# Patient Record
Sex: MALE | Race: WHITE | Employment: OTHER | URBAN - METROPOLITAN AREA
[De-identification: names, ages, dates, MRNs, and addresses within clinical notes are randomized per-mention and may not be internally consistent; named-entity substitution may affect disease eponyms.]

---

## 2017-10-25 ENCOUNTER — OFFICE VISIT (OUTPATIENT)
Dept: FAMILY MEDICINE CLINIC | Age: 67
End: 2017-10-25

## 2017-10-25 VITALS
RESPIRATION RATE: 12 BRPM | SYSTOLIC BLOOD PRESSURE: 130 MMHG | DIASTOLIC BLOOD PRESSURE: 90 MMHG | TEMPERATURE: 97.4 F | HEART RATE: 80 BPM | WEIGHT: 233 LBS | HEIGHT: 78 IN | BODY MASS INDEX: 26.96 KG/M2

## 2017-10-25 DIAGNOSIS — M70.21 OLECRANON BURSITIS OF RIGHT ELBOW: Primary | ICD-10-CM

## 2017-10-25 PROCEDURE — 3017F COLORECTAL CA SCREEN DOC REV: CPT | Performed by: FAMILY MEDICINE

## 2017-10-25 PROCEDURE — G8419 CALC BMI OUT NRM PARAM NOF/U: HCPCS | Performed by: FAMILY MEDICINE

## 2017-10-25 PROCEDURE — 1123F ACP DISCUSS/DSCN MKR DOCD: CPT | Performed by: FAMILY MEDICINE

## 2017-10-25 PROCEDURE — 1036F TOBACCO NON-USER: CPT | Performed by: FAMILY MEDICINE

## 2017-10-25 PROCEDURE — G8427 DOCREV CUR MEDS BY ELIG CLIN: HCPCS | Performed by: FAMILY MEDICINE

## 2017-10-25 PROCEDURE — 99213 OFFICE O/P EST LOW 20 MIN: CPT | Performed by: FAMILY MEDICINE

## 2017-10-25 PROCEDURE — 4040F PNEUMOC VAC/ADMIN/RCVD: CPT | Performed by: FAMILY MEDICINE

## 2017-10-25 PROCEDURE — G8484 FLU IMMUNIZE NO ADMIN: HCPCS | Performed by: FAMILY MEDICINE

## 2017-10-25 ASSESSMENT — PATIENT HEALTH QUESTIONNAIRE - PHQ9
SUM OF ALL RESPONSES TO PHQ QUESTIONS 1-9: 0
2. FEELING DOWN, DEPRESSED OR HOPELESS: 0
SUM OF ALL RESPONSES TO PHQ9 QUESTIONS 1 & 2: 0
2. FEELING DOWN, DEPRESSED OR HOPELESS: 0
SUM OF ALL RESPONSES TO PHQ QUESTIONS 1-9: 0
1. LITTLE INTEREST OR PLEASURE IN DOING THINGS: 0
SUM OF ALL RESPONSES TO PHQ9 QUESTIONS 1 & 2: 0
1. LITTLE INTEREST OR PLEASURE IN DOING THINGS: 0

## 2017-10-25 ASSESSMENT — ENCOUNTER SYMPTOMS
SWOLLEN GLANDS: 0
VISUAL CHANGE: 0
SORE THROAT: 0
VOMITING: 0
NAUSEA: 0
ABDOMINAL PAIN: 0
CHANGE IN BOWEL HABIT: 0
COUGH: 0

## 2017-10-25 NOTE — PROGRESS NOTES
Subjective  Noelle Mesa, 75 y.o. male presents today with:  Chief Complaint   Patient presents with    Elbow Injury       Bumped right elbow about 2-1/2 weeks ago, immediately swelled-swelling has reduced. Only slightly since then. Not tender or painful. Doesn't restrict range of motion but does \"get in the way \"      Arm Injury   This is a new problem. The current episode started 1 to 4 weeks ago. The problem occurs constantly. The problem has been gradually improving. Associated symptoms include joint swelling. Pertinent negatives include no abdominal pain, anorexia, arthralgias, change in bowel habit, chest pain, chills, congestion, coughing, diaphoresis, fatigue, fever, headaches, myalgias, nausea, neck pain, numbness, rash, sore throat, swollen glands, urinary symptoms, vertigo, visual change, vomiting or weakness. Nothing aggravates the symptoms. He has tried rest for the symptoms. The treatment provided mild relief. Review of Systems   Constitutional: Negative for chills, diaphoresis, fatigue and fever. HENT: Negative for congestion and sore throat. Respiratory: Negative for cough. Cardiovascular: Negative for chest pain. Gastrointestinal: Negative for abdominal pain, anorexia, change in bowel habit, nausea and vomiting. Musculoskeletal: Positive for joint swelling. Negative for arthralgias, myalgias and neck pain. Skin: Negative for rash. Neurological: Negative for vertigo, weakness, numbness and headaches. No past medical history on file. No past surgical history on file. Social History     Social History    Marital status:      Spouse name: N/A    Number of children: N/A    Years of education: N/A     Occupational History    Not on file.      Social History Main Topics    Smoking status: Former Smoker     Quit date: 1/1/1986    Smokeless tobacco: Never Used    Alcohol use Not on file    Drug use: Unknown    Sexual activity: Not on file     Other Topics Concern    Not on file     Social History Narrative    No narrative on file     No family history on file. No Known Allergies  Current Outpatient Prescriptions on File Prior to Visit   Medication Sig Dispense Refill    Omega-3 Fatty Acids (FISH OIL) 1000 MG CAPS Take 1,000 mg by mouth 3 times daily      Multiple Vitamins-Minerals (SUNVITE ACTIVE ADULT 50+ PO) Take by mouth      Coenzyme Q10 (COQ-10) 200 MG CAPS Take by mouth      aspirin 81 MG tablet Take 81 mg by mouth daily      Cholecalciferol (VITAMIN D3) 1000 UNITS TABS Take by mouth      Multiple Vitamins-Minerals (SUPER ANTHONY KALIA 75/BETA DE LA FUENTE PO) Take by mouth prostate vit. No current facility-administered medications on file prior to visit. Objective    Vitals:    10/25/17 1704   BP: (!) 130/90   Site: Right Arm   Position: Sitting   Cuff Size: Medium Adult   Pulse: 80   Resp: 12   Temp: 97.4 °F (36.3 °C)   TempSrc: Tympanic   Weight: 233 lb (105.7 kg)   Height: 6' 8\" (2.032 m)     Physical Exam   Constitutional: He is oriented to person, place, and time. He appears well-developed and well-nourished. No distress. HENT:   Head: Normocephalic and atraumatic. Cardiovascular: Normal rate and regular rhythm. Pulmonary/Chest: Effort normal and breath sounds normal.   Abdominal: Soft. Bowel sounds are normal.   Musculoskeletal:        Right elbow: He exhibits swelling (Olecranon bursa). He exhibits normal range of motion. No olecranon process tenderness noted. Neurological: He is alert and oriented to person, place, and time. Skin: Skin is warm and dry. He is not diaphoretic. Nursing note and vitals reviewed. Assessment & Plan   1. Olecranon bursitis of right elbow       No orders of the defined types were placed in this encounter. No orders of the defined types were placed in this encounter. There are no discontinued medications.     Counseling given: Not Answered  discussed options including observation, drainage, topical therapy, oral therapy. Patient would like to try topical therapy. -Was given samples of Pennsaid. He is to do one packet twice a day. If he does not get significant improvement in 3 weeks. He can come back and we can drain. Patient stated understanding of risks of draining olecranon bursa. Return if symptoms worsen or fail to improve.     Efe Michele, DO

## 2018-08-30 ENCOUNTER — OFFICE VISIT (OUTPATIENT)
Dept: FAMILY MEDICINE CLINIC | Age: 68
End: 2018-08-30

## 2018-08-30 VITALS
DIASTOLIC BLOOD PRESSURE: 76 MMHG | WEIGHT: 233 LBS | OXYGEN SATURATION: 97 % | BODY MASS INDEX: 26.96 KG/M2 | TEMPERATURE: 98.7 F | SYSTOLIC BLOOD PRESSURE: 136 MMHG | HEIGHT: 78 IN | RESPIRATION RATE: 20 BRPM | HEART RATE: 88 BPM

## 2018-08-30 DIAGNOSIS — J01.90 ACUTE BACTERIAL SINUSITIS: ICD-10-CM

## 2018-08-30 DIAGNOSIS — Z87.891 HISTORY OF TOBACCO USE: ICD-10-CM

## 2018-08-30 DIAGNOSIS — J01.90 ACUTE BACTERIAL SINUSITIS: Primary | ICD-10-CM

## 2018-08-30 DIAGNOSIS — B96.89 ACUTE BACTERIAL SINUSITIS: Primary | ICD-10-CM

## 2018-08-30 DIAGNOSIS — B96.89 ACUTE BACTERIAL SINUSITIS: ICD-10-CM

## 2018-08-30 LAB — S PYO AG THROAT QL: NORMAL

## 2018-08-30 PROCEDURE — 99213 OFFICE O/P EST LOW 20 MIN: CPT | Performed by: INTERNAL MEDICINE

## 2018-08-30 PROCEDURE — 87880 STREP A ASSAY W/OPTIC: CPT | Performed by: INTERNAL MEDICINE

## 2018-08-30 RX ORDER — AMOXICILLIN AND CLAVULANATE POTASSIUM 875; 125 MG/1; MG/1
1 TABLET, FILM COATED ORAL 2 TIMES DAILY
Qty: 14 TABLET | Refills: 0 | Status: SHIPPED | OUTPATIENT
Start: 2018-08-30 | End: 2018-09-06

## 2018-08-30 ASSESSMENT — ENCOUNTER SYMPTOMS
SINUS PRESSURE: 1
EYE PAIN: 0
ABDOMINAL PAIN: 0
SHORTNESS OF BREATH: 0
BACK PAIN: 0

## 2018-08-30 NOTE — PROGRESS NOTES
Subjective:      Patient ID: Amber Christiansen is a 70 y.o. male who presents today with:  Chief Complaint   Patient presents with   Omi Carbo     left ear    Headache     top of head    Sinus Problem     lots of drainage    Hoarse     voice is scratchy    Fatigue     feeling weak    Pharyngitis     sore throat    Cough     a little cough       HPI    2 weeks of ear \"whsitling\" and popping. Fatigue. Cough and congestion. No headaches or vision changes. History reviewed. No pertinent past medical history. History reviewed. No pertinent surgical history. Social History     Social History    Marital status:      Spouse name: N/A    Number of children: N/A    Years of education: N/A     Occupational History    Not on file. Social History Main Topics    Smoking status: Former Smoker     Packs/day: 2.00     Years: 27.00     Start date: 1959     Quit date: 1/1/1986    Smokeless tobacco: Never Used    Alcohol use Yes    Drug use: Unknown    Sexual activity: Not on file     Other Topics Concern    Not on file     Social History Narrative    No narrative on file     No Known Allergies  Current Outpatient Prescriptions on File Prior to Visit   Medication Sig Dispense Refill    Omega-3 Fatty Acids (FISH OIL) 1000 MG CAPS Take 1,000 mg by mouth 3 times daily      Multiple Vitamins-Minerals (SUNVITE ACTIVE ADULT 50+ PO) Take by mouth      Coenzyme Q10 (COQ-10) 200 MG CAPS Take by mouth      aspirin 81 MG tablet Take 81 mg by mouth daily      Cholecalciferol (VITAMIN D3) 1000 UNITS TABS Take by mouth      Multiple Vitamins-Minerals (SUPER ANTHONY KALIA 75/BETA DE LA FUENTE PO) Take by mouth prostate vit. No current facility-administered medications on file prior to visit. I have personally reviewed the ROS, PMH, PFH, and social history     Review of Systems   Constitutional: Negative for chills. HENT: Positive for ear pain and sinus pressure. Negative for congestion.     Eyes: Negative for pain.   Respiratory: Negative for shortness of breath. Cardiovascular: Negative for chest pain. Gastrointestinal: Negative for abdominal pain. Genitourinary: Negative for hematuria. Musculoskeletal: Negative for back pain. Allergic/Immunologic: Negative for immunocompromised state. Neurological: Negative for headaches. Psychiatric/Behavioral: Negative for hallucinations. Objective:   /76 (Site: Left Arm, Position: Sitting, Cuff Size: Large Adult)   Pulse 88   Temp 98.7 °F (37.1 °C) (Tympanic)   Resp 20   Ht 6' 8\" (2.032 m)   Wt 233 lb (105.7 kg)   SpO2 97%   BMI 25.60 kg/m²     Physical Exam   Constitutional: He is oriented to person, place, and time. He appears well-developed and well-nourished. HENT:   Head: Normocephalic. Right Ear: External ear normal.   Left Ear: External ear normal.   No vertical nystagmus    Eyes: Pupils are equal, round, and reactive to light. Neck: No tracheal deviation present. Cardiovascular: Normal rate, regular rhythm and normal heart sounds. Exam reveals no gallop and no friction rub. No murmur heard. Pulmonary/Chest: No respiratory distress. He has no wheezes. He has no rales. Abdominal: Soft. Bowel sounds are normal. He exhibits no distension. There is no rebound. Musculoskeletal: He exhibits no edema. Neurological: He is oriented to person, place, and time. Skin: Skin is warm and dry. Assessment:       Diagnosis Orders   1. Acute bacterial sinusitis  amoxicillin-clavulanate (AUGMENTIN) 875-125 MG per tablet   2. History of tobacco use  US SCREENING FOR AAA         Plan:    Orders per below. Understands aaa rupture can be fatal, he agrees to get this done. Explained risk of worsening infection, and if it should progress despite antibiotics to call 911 immediately.  Explained risk of sepsis, amputation, death, etc.     The patient was reminded that an antibiotic has been prescribed the predicted course is improvement

## 2018-09-01 LAB — THROAT CULTURE: NORMAL

## 2018-09-17 ENCOUNTER — TELEPHONE (OUTPATIENT)
Dept: FAMILY MEDICINE CLINIC | Age: 68
End: 2018-09-17

## 2018-11-29 ENCOUNTER — OFFICE VISIT (OUTPATIENT)
Dept: FAMILY MEDICINE CLINIC | Age: 68
End: 2018-11-29

## 2018-11-29 ENCOUNTER — TELEPHONE (OUTPATIENT)
Dept: FAMILY MEDICINE CLINIC | Age: 68
End: 2018-11-29

## 2018-11-29 VITALS
OXYGEN SATURATION: 98 % | DIASTOLIC BLOOD PRESSURE: 80 MMHG | RESPIRATION RATE: 15 BRPM | WEIGHT: 224.7 LBS | HEIGHT: 78 IN | HEART RATE: 68 BPM | BODY MASS INDEX: 26 KG/M2 | SYSTOLIC BLOOD PRESSURE: 136 MMHG | TEMPERATURE: 97.1 F

## 2018-11-29 DIAGNOSIS — R05.9 COUGH: ICD-10-CM

## 2018-11-29 DIAGNOSIS — I10 ESSENTIAL HYPERTENSION: ICD-10-CM

## 2018-11-29 DIAGNOSIS — J32.1 FRONTAL SINUSITIS, UNSPECIFIED CHRONICITY: Primary | ICD-10-CM

## 2018-11-29 PROCEDURE — 99213 OFFICE O/P EST LOW 20 MIN: CPT | Performed by: INTERNAL MEDICINE

## 2018-11-29 RX ORDER — AMOXICILLIN AND CLAVULANATE POTASSIUM 875; 125 MG/1; MG/1
1 TABLET, FILM COATED ORAL 2 TIMES DAILY
Qty: 20 TABLET | Refills: 0 | Status: SHIPPED | OUTPATIENT
Start: 2018-11-29 | End: 2018-12-09

## 2018-11-29 RX ORDER — AMLODIPINE BESYLATE 2.5 MG/1
2.5 TABLET ORAL DAILY
Qty: 30 TABLET | Refills: 1 | Status: SHIPPED | OUTPATIENT
Start: 2018-11-29 | End: 2020-10-20

## 2018-11-29 RX ORDER — PREDNISONE 20 MG/1
TABLET ORAL
Qty: 10 TABLET | Refills: 0 | Status: SHIPPED | OUTPATIENT
Start: 2018-11-29 | End: 2019-02-07 | Stop reason: ALTCHOICE

## 2018-11-29 ASSESSMENT — ENCOUNTER SYMPTOMS
SINUS PRESSURE: 1
SHORTNESS OF BREATH: 0
BACK PAIN: 0
ABDOMINAL PAIN: 0
EYE PAIN: 0
SINUS PAIN: 1

## 2018-11-29 ASSESSMENT — PATIENT HEALTH QUESTIONNAIRE - PHQ9
SUM OF ALL RESPONSES TO PHQ9 QUESTIONS 1 & 2: 2
2. FEELING DOWN, DEPRESSED OR HOPELESS: 1
SUM OF ALL RESPONSES TO PHQ QUESTIONS 1-9: 2
SUM OF ALL RESPONSES TO PHQ QUESTIONS 1-9: 2
1. LITTLE INTEREST OR PLEASURE IN DOING THINGS: 1

## 2018-11-29 NOTE — PROGRESS NOTES
Subjective:      Patient ID: Shirley Doran is a 70 y.o. male who presents today with:  Chief Complaint   Patient presents with    Facial Pain     sinus pressure, facial pain, nasal drainage, he feels that it may be draining into his chest.     Fatigue     he feels very weak, and tired        HPI  Sinus pain and pressure. Been affecting him for several days. Some ear cracking and popping. He has family with sinus infections. History reviewed. No pertinent past medical history. History reviewed. No pertinent surgical history. Social History     Social History    Marital status:      Spouse name: N/A    Number of children: N/A    Years of education: N/A     Occupational History    Not on file. Social History Main Topics    Smoking status: Former Smoker     Packs/day: 2.00     Years: 27.00     Start date: 1959     Quit date: 1/1/1986    Smokeless tobacco: Never Used    Alcohol use Yes    Drug use: Unknown    Sexual activity: Not on file     Other Topics Concern    Not on file     Social History Narrative    No narrative on file     No Known Allergies  Current Outpatient Prescriptions on File Prior to Visit   Medication Sig Dispense Refill    Omega-3 Fatty Acids (FISH OIL) 1000 MG CAPS Take 1,000 mg by mouth 3 times daily      Multiple Vitamins-Minerals (SUNVITE ACTIVE ADULT 50+ PO) Take by mouth      Coenzyme Q10 (COQ-10) 200 MG CAPS Take by mouth      aspirin 81 MG tablet Take 81 mg by mouth daily      Cholecalciferol (VITAMIN D3) 1000 UNITS TABS Take by mouth      Multiple Vitamins-Minerals (SUPER ANTHONY KALIA 75/BETA DE LA FUENTE PO) Take by mouth prostate vit. No current facility-administered medications on file prior to visit. I have personally reviewed the ROS, PMH, PFH, and social history     Review of Systems   Constitutional: Negative for chills. HENT: Positive for sinus pain and sinus pressure. Negative for congestion. Eyes: Negative for pain.    Respiratory: Negative for shortness of breath. Cardiovascular: Negative for chest pain. Gastrointestinal: Negative for abdominal pain. Genitourinary: Negative for hematuria. Musculoskeletal: Negative for back pain. Allergic/Immunologic: Negative for immunocompromised state. Neurological: Negative for headaches. Psychiatric/Behavioral: Negative for hallucinations. Objective:   /80 (Site: Left Upper Arm, Position: Sitting, Cuff Size: Large Adult)   Pulse 68   Temp 97.1 °F (36.2 °C) (Tympanic)   Resp 15   Ht 6' 8\" (2.032 m)   Wt 224 lb 11.2 oz (101.9 kg)   SpO2 98%   BMI 24.68 kg/m²     Physical Exam   Constitutional: He is oriented to person, place, and time. He appears well-developed and well-nourished. HENT:   Head: Normocephalic. Eyes: Pupils are equal, round, and reactive to light. Neck: No tracheal deviation present. Cardiovascular: Normal rate, regular rhythm and normal heart sounds. Exam reveals no gallop and no friction rub. No murmur heard. Pulmonary/Chest: No respiratory distress. Abdominal: Soft. Bowel sounds are normal. He exhibits no distension. There is no rebound. Musculoskeletal: He exhibits no edema. Neurological: He is oriented to person, place, and time. Skin: Skin is warm and dry. Assessment:       Diagnosis Orders   1. Frontal sinusitis, unspecified chronicity  amoxicillin-clavulanate (AUGMENTIN) 875-125 MG per tablet    predniSONE (DELTASONE) 20 MG tablet   2. Cough  XR CHEST STANDARD (2 VW)   3. Essential hypertension  amLODIPine (NORVASC) 2.5 MG tablet         Plan:    Orders per below  He's eating less but lost weight  Rec he get AAA (Risk of rupture being fatal explained)  If losing weight unintentional call me, get CT thorax. Needs colonoscopy understands risk of cancer. Explained risk of worsening infection, and if it should progress despite antibiotics to call 911 immediately.  Explained risk of sepsis, amputation, death, etc.   See me in 6

## 2018-11-29 NOTE — PATIENT INSTRUCTIONS
Patient Education          guaifenesin  Pronunciation:  ceasarye FEN e sin  Brand:  Mucinex, Robitussin Mucus + Chest Congestion, Triaminic Chest Congestion, Tussin Expectorant, Xpect  What is the most important information I should know about guaifenesin? Follow all directions on your medicine label and package. Tell each of your healthcare providers about all your medical conditions, allergies, and all medicines you use. What is guaifenesin? Guaifenesin is an expectorant. It helps loosen congestion in your chest and throat, making it easier to cough out through your mouth. Guaifenesin is used to reduce chest congestion caused by the common cold, infections, or allergies. Guaifenesin may also be used for purposes not listed in this medication guide. What should I discuss with my healthcare provider before taking guaifenesin? You should not use this medicine if you are allergic to guaifenesin. Ask a doctor or pharmacist if it is safe for you to use this medicine if you have other medical conditions. It is not known whether guaifenesin will harm an unborn baby. Ask a doctor before using this medicine if you are pregnant. It is not known whether guaifenesin passes into breast milk or if it could harm a nursing baby. Ask a doctor before using this medicine if you are breast-feeding a baby. Do not give this medicine to a child without medical advice. How should I take guaifenesin? Use exactly as directed on the label, or as prescribed by your doctor. Do not use in larger or smaller amounts or for longer than recommended. Cough medicine is usually taken only for a short time until your symptoms clear up. Take guaifenesin with food if it upsets your stomach. Measure liquid medicine with the dosing syringe provided, or with a special dose-measuring spoon or medicine cup. If you do not have a dose-measuring device, ask your pharmacist for one.   Drink extra fluids to help loosen the congestion and lubricate your

## 2018-11-30 ENCOUNTER — CARE COORDINATION (OUTPATIENT)
Dept: CARE COORDINATION | Age: 68
End: 2018-11-30

## 2018-11-30 NOTE — CARE COORDINATION
Ambulatory Care Coordination Note  11/30/2018  CM Risk Score: 0  Barbi Mortality Risk Score: 3.85    ACC: Claudean Finer, RN    Summary Note: Called pt as requested by Dr. Tayo Spangler to discuss current needs and possible referral to  for assistance in getting additional insurance. [facility]MM requesting a call back to discuss. Prior to Admission medications    Medication Sig Start Date End Date Taking? Authorizing Provider   amoxicillin-clavulanate (AUGMENTIN) 875-125 MG per tablet Take 1 tablet by mouth 2 times daily for 10 days 11/29/18 12/9/18  Luana Duverney, MD   predniSONE (DELTASONE) 20 MG tablet Take 2 tabs po once daily 11/29/18   Luana Duverney, MD   amLODIPine (NORVASC) 2.5 MG tablet Take 1 tablet by mouth daily 11/29/18   Luana Duverney, MD   Omega-3 Fatty Acids (FISH OIL) 1000 MG CAPS Take 1,000 mg by mouth 3 times daily    Historical Provider, MD   Multiple Vitamins-Minerals ([address] 50+ PO) Take by mouth    Historical Provider, MD   Coenzyme Q10 (COQ-10) 200 MG CAPS Take by mouth    Historical Provider, MD   aspirin 81 MG tablet Take 81 mg by mouth daily    Historical Provider, MD   Cholecalciferol (VITAMIN D3) 1000 UNITS TABS Take by mouth    Historical Provider, MD   Multiple Vitamins-Minerals (SUPER ANTHONY KALIA 75/BETA DE LA FUENTE PO) Take by mouth prostate vit.     Historical Provider, MD       Future Appointments  Date Time Provider Rosa Isela Hebert   1/10/2019 9:30 AM Luana Duverney,  [place], [place] [address]

## 2018-12-14 ENCOUNTER — TELEPHONE (OUTPATIENT)
Dept: FAMILY MEDICINE CLINIC | Age: 68
End: 2018-12-14

## 2019-01-10 ENCOUNTER — CARE COORDINATION (OUTPATIENT)
Dept: CARE COORDINATION | Age: 69
End: 2019-01-10

## 2019-01-22 ENCOUNTER — CARE COORDINATION (OUTPATIENT)
Dept: CARE COORDINATION | Age: 69
End: 2019-01-22

## 2019-02-07 ENCOUNTER — OFFICE VISIT (OUTPATIENT)
Dept: FAMILY MEDICINE CLINIC | Age: 69
End: 2019-02-07

## 2019-02-07 VITALS
WEIGHT: 225 LBS | SYSTOLIC BLOOD PRESSURE: 138 MMHG | DIASTOLIC BLOOD PRESSURE: 84 MMHG | OXYGEN SATURATION: 98 % | HEIGHT: 78 IN | TEMPERATURE: 97.3 F | RESPIRATION RATE: 15 BRPM | HEART RATE: 81 BPM | BODY MASS INDEX: 26.03 KG/M2

## 2019-02-07 DIAGNOSIS — K21.9 GASTROESOPHAGEAL REFLUX DISEASE WITHOUT ESOPHAGITIS: ICD-10-CM

## 2019-02-07 DIAGNOSIS — R10.13 DYSPEPSIA: ICD-10-CM

## 2019-02-07 DIAGNOSIS — K21.9 GASTROESOPHAGEAL REFLUX DISEASE WITHOUT ESOPHAGITIS: Primary | ICD-10-CM

## 2019-02-07 DIAGNOSIS — M54.50 ACUTE LEFT-SIDED LOW BACK PAIN WITHOUT SCIATICA: ICD-10-CM

## 2019-02-07 LAB
ALBUMIN SERPL-MCNC: 4.5 G/DL (ref 3.5–4.6)
ALP BLD-CCNC: 57 U/L (ref 35–104)
ALT SERPL-CCNC: 20 U/L (ref 0–41)
ANION GAP SERPL CALCULATED.3IONS-SCNC: 16 MEQ/L (ref 9–15)
AST SERPL-CCNC: 19 U/L (ref 0–40)
BASOPHILS ABSOLUTE: 0.1 K/UL (ref 0–0.2)
BASOPHILS RELATIVE PERCENT: 0.8 %
BILIRUB SERPL-MCNC: <0.2 MG/DL (ref 0.2–0.7)
BILIRUBIN URINE: NEGATIVE
BLOOD, URINE: NEGATIVE
BUN BLDV-MCNC: 26 MG/DL (ref 8–23)
CALCIUM SERPL-MCNC: 10.7 MG/DL (ref 8.5–9.9)
CHLORIDE BLD-SCNC: 100 MEQ/L (ref 95–107)
CLARITY: CLEAR
CO2: 27 MEQ/L (ref 20–31)
COLOR: YELLOW
CREAT SERPL-MCNC: 0.89 MG/DL (ref 0.7–1.2)
EOSINOPHILS ABSOLUTE: 0.1 K/UL (ref 0–0.7)
EOSINOPHILS RELATIVE PERCENT: 1.7 %
GFR AFRICAN AMERICAN: >60
GFR NON-AFRICAN AMERICAN: >60
GLOBULIN: 3.3 G/DL (ref 2.3–3.5)
GLUCOSE BLD-MCNC: 101 MG/DL (ref 70–99)
GLUCOSE URINE: NEGATIVE MG/DL
HCT VFR BLD CALC: 48 % (ref 42–52)
HEMOGLOBIN: 16.1 G/DL (ref 14–18)
KETONES, URINE: NEGATIVE MG/DL
LEUKOCYTE ESTERASE, URINE: NEGATIVE
LIPASE: 34 U/L (ref 12–95)
LYMPHOCYTES ABSOLUTE: 2 K/UL (ref 1–4.8)
LYMPHOCYTES RELATIVE PERCENT: 29.9 %
MCH RBC QN AUTO: 32.3 PG (ref 27–31.3)
MCHC RBC AUTO-ENTMCNC: 33.5 % (ref 33–37)
MCV RBC AUTO: 96.5 FL (ref 80–100)
MONOCYTES ABSOLUTE: 0.5 K/UL (ref 0.2–0.8)
MONOCYTES RELATIVE PERCENT: 7 %
NEUTROPHILS ABSOLUTE: 4 K/UL (ref 1.4–6.5)
NEUTROPHILS RELATIVE PERCENT: 60.6 %
NITRITE, URINE: NEGATIVE
PDW BLD-RTO: 13.9 % (ref 11.5–14.5)
PH UA: 5 (ref 5–9)
PLATELET # BLD: 236 K/UL (ref 130–400)
POTASSIUM SERPL-SCNC: 4.8 MEQ/L (ref 3.4–4.9)
PROTEIN UA: NEGATIVE MG/DL
RBC # BLD: 4.97 M/UL (ref 4.7–6.1)
SODIUM BLD-SCNC: 143 MEQ/L (ref 135–144)
SPECIFIC GRAVITY UA: 1.02 (ref 1–1.03)
TOTAL PROTEIN: 7.8 G/DL (ref 6.3–8)
UROBILINOGEN, URINE: 0.2 E.U./DL
WBC # BLD: 6.6 K/UL (ref 4.8–10.8)

## 2019-02-07 PROCEDURE — 99214 OFFICE O/P EST MOD 30 MIN: CPT | Performed by: INTERNAL MEDICINE

## 2019-02-07 RX ORDER — OMEPRAZOLE 40 MG/1
40 CAPSULE, DELAYED RELEASE ORAL
Qty: 90 CAPSULE | Refills: 0 | Status: SHIPPED | OUTPATIENT
Start: 2019-02-07 | End: 2020-10-20

## 2019-02-07 ASSESSMENT — ENCOUNTER SYMPTOMS
NAUSEA: 0
ABDOMINAL PAIN: 0
ANAL BLEEDING: 0
EYE PAIN: 0
BLOOD IN STOOL: 0
SHORTNESS OF BREATH: 0
BACK PAIN: 1

## 2019-02-07 ASSESSMENT — PATIENT HEALTH QUESTIONNAIRE - PHQ9
SUM OF ALL RESPONSES TO PHQ QUESTIONS 1-9: 2
2. FEELING DOWN, DEPRESSED OR HOPELESS: 1
SUM OF ALL RESPONSES TO PHQ9 QUESTIONS 1 & 2: 2
1. LITTLE INTEREST OR PLEASURE IN DOING THINGS: 1
SUM OF ALL RESPONSES TO PHQ QUESTIONS 1-9: 2

## 2019-02-12 DIAGNOSIS — R79.9 ELEVATED BUN: ICD-10-CM

## 2019-02-12 DIAGNOSIS — R73.09 ELEVATED GLUCOSE: Primary | ICD-10-CM

## 2019-02-12 DIAGNOSIS — E83.52 SERUM CALCIUM ELEVATED: ICD-10-CM

## 2020-08-27 ENCOUNTER — VIRTUAL VISIT (OUTPATIENT)
Dept: FAMILY MEDICINE CLINIC | Age: 70
End: 2020-08-27

## 2020-08-27 PROCEDURE — 1123F ACP DISCUSS/DSCN MKR DOCD: CPT | Performed by: INTERNAL MEDICINE

## 2020-08-27 PROCEDURE — G8428 CUR MEDS NOT DOCUMENT: HCPCS | Performed by: INTERNAL MEDICINE

## 2020-08-27 PROCEDURE — G8421 BMI NOT CALCULATED: HCPCS | Performed by: INTERNAL MEDICINE

## 2020-08-27 PROCEDURE — 4040F PNEUMOC VAC/ADMIN/RCVD: CPT | Performed by: INTERNAL MEDICINE

## 2020-08-27 PROCEDURE — 1036F TOBACCO NON-USER: CPT | Performed by: INTERNAL MEDICINE

## 2020-08-27 PROCEDURE — 99213 OFFICE O/P EST LOW 20 MIN: CPT | Performed by: INTERNAL MEDICINE

## 2020-08-27 PROCEDURE — 3017F COLORECTAL CA SCREEN DOC REV: CPT | Performed by: INTERNAL MEDICINE

## 2020-08-27 RX ORDER — GREEN TEA/HOODIA GORDONII 315-12.5MG
1 CAPSULE ORAL 2 TIMES DAILY
Qty: 30 TABLET | Refills: 0 | Status: SHIPPED | OUTPATIENT
Start: 2020-08-27 | End: 2020-09-11

## 2020-08-27 RX ORDER — AMOXICILLIN AND CLAVULANATE POTASSIUM 875; 125 MG/1; MG/1
1 TABLET, FILM COATED ORAL 2 TIMES DAILY
Qty: 14 TABLET | Refills: 0 | Status: SHIPPED | OUTPATIENT
Start: 2020-08-27 | End: 2020-09-03

## 2020-08-27 ASSESSMENT — ENCOUNTER SYMPTOMS
VOICE CHANGE: 0
BACK PAIN: 0
RECTAL PAIN: 0
SINUS PAIN: 0
NAUSEA: 0
WHEEZING: 0
COLOR CHANGE: 0
EYE ITCHING: 0
CONSTIPATION: 0
SINUS PRESSURE: 0
PHOTOPHOBIA: 0
FACIAL SWELLING: 0
APNEA: 0
ABDOMINAL PAIN: 0
DIARRHEA: 0
EYE PAIN: 0
SORE THROAT: 0
COUGH: 0
CHEST TIGHTNESS: 0
EYE REDNESS: 0
VOMITING: 0
BLOOD IN STOOL: 0
EYE DISCHARGE: 0
ABDOMINAL DISTENTION: 0
RHINORRHEA: 0
TROUBLE SWALLOWING: 0
SHORTNESS OF BREATH: 0

## 2020-08-27 NOTE — PROGRESS NOTES
2020    Rainer Sparks (:  1950) is a 69 y.o. male, here for evaluation of the following medical concerns:Tooth pain and swelling    HPI    42-year-old male presents with a complaint of left upper jaw pain and swelling. The patient believes he has a tooth infection. .  The patient reports that the pain is so severe that is keeping him up at night. He denies fevers chills nausea, emesis or night sweats. Review of Systems   Constitutional: Negative for chills, diaphoresis, fatigue and fever. HENT: Negative for congestion, dental problem, drooling, ear discharge, ear pain, facial swelling, hearing loss, mouth sores, nosebleeds, postnasal drip, rhinorrhea, sinus pressure, sinus pain, sneezing, sore throat, tinnitus, trouble swallowing and voice change. Tooth pain     Eyes: Negative for photophobia, pain, discharge, redness, itching and visual disturbance. Respiratory: Negative for apnea, cough, chest tightness, shortness of breath and wheezing. Cardiovascular: Negative for chest pain, palpitations and leg swelling. Gastrointestinal: Negative for abdominal distention, abdominal pain, blood in stool, constipation, diarrhea, nausea, rectal pain and vomiting. Endocrine: Negative for cold intolerance, heat intolerance, polydipsia, polyphagia and polyuria. Genitourinary: Negative for decreased urine volume, difficulty urinating, dysuria, flank pain, frequency, genital sores, hematuria and urgency. Musculoskeletal: Negative for arthralgias, back pain, gait problem, joint swelling, myalgias, neck pain and neck stiffness. Skin: Negative for color change, rash and wound. Allergic/Immunologic: Negative for environmental allergies and food allergies. Neurological: Negative for dizziness, tremors, seizures, syncope, facial asymmetry, speech difficulty, weakness, light-headedness, numbness and headaches. Hematological: Negative for adenopathy. Does not bruise/bleed easily. Psychiatric/Behavioral: Negative for agitation, confusion, decreased concentration, hallucinations, self-injury, sleep disturbance and suicidal ideas. The patient is not nervous/anxious. Prior to Visit Medications    Medication Sig Taking? Authorizing Provider   amoxicillin-clavulanate (AUGMENTIN) 875-125 MG per tablet Take 1 tablet by mouth 2 times daily for 7 days Yes Glendy Maher MD   Probiotic Acidophilus [facility]) TABS Take 1 tablet by mouth 2 times daily for 15 days Yes Glendy Maher MD   omeprazole (PRILOSEC) 40 MG delayed release capsule Take 1 capsule by mouth every morning (before breakfast)  Gt Nielsen MD   amLODIPine (NORVASC) 2.5 MG tablet Take 1 tablet by mouth daily  Gt Nielsen MD   Omega-3 Fatty Acids (FISH OIL) 1000 MG CAPS Take 1,000 mg by mouth 3 times daily  Historical Provider, MD   Multiple Vitamins-Minerals (SUNVITE ACTIVE ADULT 50+ PO) Take by mouth  Historical Provider, MD   Coenzyme Q10 (COQ-10) 200 MG CAPS Take by mouth  Historical Provider, MD   aspirin 81 MG tablet Take 81 mg by mouth daily  Historical Provider, MD   Cholecalciferol (VITAMIN D3) 1000 UNITS TABS Take by mouth  Historical Provider, MD   Multiple Vitamins-Minerals (SUPER ANTHONY KALIA 75/BETA DE LA FUENTE PO) Take by mouth prostate vit. Historical Provider, MD        No Known Allergies    No past medical history on file. No past surgical history on file.     Social History     Socioeconomic History    Marital status:      Spouse name: Not on file    Number of children: Not on file    Years of education: Not on file    Highest education level: Not on file   Occupational History    Not on file   Social Needs    Financial resource strain: Not on file    Food insecurity     Worry: Not on file     Inability: Not on file    Transportation needs     Medical: Not on file     Non-medical: Not on file   Tobacco Use    Smoking status: Former Smoker     Packs/day: 2.00     Years: 27.00     Pack years: 54.00     Start date: 64     Last attempt to quit: 1986     Years since quittin.6    Smokeless tobacco: Never Used   Substance and Sexual Activity    Alcohol use: Yes    Drug use: Not on file    Sexual activity: Not on file   Lifestyle    Physical activity     Days per week: Not on file     Minutes per session: Not on file    Stress: Not on file   Relationships    Social connections     Talks on phone: Not on file     Gets together: Not on file     Attends [employer] service: Not on file     Active member of club or organization: Not on file     Attends meetings of clubs or organizations: Not on file     Relationship status: Not on file    Intimate partner violence     Fear of current or ex partner: Not on file     Emotionally abused: Not on file     Physically abused: Not on file     Forced sexual activity: Not on file   Other Topics Concern    Not on file   Social History Narrative    Not on file        No family history on file. There were no vitals filed for this visit. Estimated body mass index is 24.72 kg/m² as calculated from the following:    Height as of 19: 6' 8\" (2.032 m). Weight as of 19: 225 lb (102.1 kg). Physical Exam    ASSESSMENT/PLAN:  1. Tooth abscess  Will start Keflex and a probiotic I have encouraged the patient to make an urgent appointment with a dentist.  Return if symptoms worsen or fail to improve. An  electronic signature was used to authenticate this note.     --Alexis Rebolledo MD on 2020 at 3:58 PM

## 2020-09-02 ENCOUNTER — TELEPHONE (OUTPATIENT)
Dept: ADMINISTRATIVE | Age: 70
End: 2020-09-02

## 2020-09-02 ENCOUNTER — VIRTUAL VISIT (OUTPATIENT)
Dept: FAMILY MEDICINE CLINIC | Age: 70
End: 2020-09-02

## 2020-09-02 ENCOUNTER — NURSE TRIAGE (OUTPATIENT)
Dept: OTHER | Facility: CLINIC | Age: 70
End: 2020-09-02

## 2020-09-02 ENCOUNTER — NURSE ONLY (OUTPATIENT)
Dept: FAMILY MEDICINE CLINIC | Age: 70
End: 2020-09-02

## 2020-09-02 DIAGNOSIS — R68.83 CHILLS: ICD-10-CM

## 2020-09-02 PROCEDURE — 99442 PR PHYS/QHP TELEPHONE EVALUATION 11-20 MIN: CPT | Performed by: NURSE PRACTITIONER

## 2020-09-02 RX ORDER — FLUTICASONE PROPIONATE 50 MCG
1 SPRAY, SUSPENSION (ML) NASAL DAILY
Qty: 1 BOTTLE | Refills: 0
Start: 2020-09-02 | End: 2020-10-20

## 2020-09-02 RX ORDER — AMOXICILLIN AND CLAVULANATE POTASSIUM 875; 125 MG/1; MG/1
1 TABLET, FILM COATED ORAL 2 TIMES DAILY
Qty: 10 TABLET | Refills: 0 | Status: SHIPPED | OUTPATIENT
Start: 2020-09-02 | End: 2020-09-07

## 2020-09-02 ASSESSMENT — ENCOUNTER SYMPTOMS
SORE THROAT: 0
SHORTNESS OF BREATH: 0
SINUS PAIN: 1
NAUSEA: 0
SINUS PRESSURE: 1
RHINORRHEA: 1
VOMITING: 0
VOICE CHANGE: 1
COUGH: 0
FACIAL SWELLING: 1
EYE DISCHARGE: 0
DIARRHEA: 0

## 2020-09-02 NOTE — PROGRESS NOTES
TELEHEALTH EVALUATION -- Audio/Visual (During MSJUH-71 public health emergency)    -   Jaquelin Hoyos is a 69 y.o. male being evaluated by a Virtual Visit (video visit) encounter to address concerns as mentioned above. A caregiver was present when appropriate. Due to this being a TeleHealth encounter (During XGRUN-93 public health emergency), evaluation of the following organ systems was limited: Vitals/Constitutional/EENT/Resp/CV/GI//MS/Neuro/Skin/Heme-Lymph-Imm. Pursuant to the emergency declaration under the [facility], [address] [place] [address] authority and the [employer] Act, this Virtual Visit was conducted with patient's (and/or legal guardian's) consent, to reduce the patient's risk of exposure to COVID-19 and provide necessary medical care. The patient (and/or legal guardian) has also been advised to contact this office for worsening conditions or problems, and seek emergency medical treatment and/or call 911 if deemed necessary. Patient was contacted and agreed to proceed with a virtual visit via Telephone Visit  The risks and benefits of converting to a virtual visit were discussed in light of the current infectious disease epidemic. Patient also understood that insurance coverage and co-pays are up to their individual insurance plans. Patient was located at their home. Provider was located at their office.      2020  Jaquelin Hoyos (:  1950) has requested an audio/video evaluation for the following concern(s):    HPI    Face swelled up last week on the left cheek bone  Possible infection tooth or sinus  Was put on antibiotic  Still has a couple antibiotics  Sinuses run and run for months and voice raspy  Took Rx for augmentin   This morning yesterday and today maybe have a fever  Hot and cold  Have not felt strong in 3-4 months   No energy  Sleep all the time  [facility] get [facility] and face is going down so antibiotics are working  Not really sure if its a tooth or not          Review of Systems   Constitutional: Positive for chills and fatigue. HENT: Positive for facial swelling, postnasal drip, rhinorrhea, sinus pressure, sinus pain and voice change. Negative for sore throat. Eyes: Negative for discharge. Respiratory: Negative for cough and shortness of breath. Gastrointestinal: Negative for diarrhea, nausea and vomiting. Neurological: Negative for dizziness and headaches. Psychiatric/Behavioral: Negative for agitation. Prior to Visit Medications    Medication Sig Taking? Authorizing Provider   amoxicillin-clavulanate (AUGMENTIN) 875-125 MG per tablet Take 1 tablet by mouth 2 times daily for 5 days Yes GARY Jaeger - CNP   fluticasone (FLONASE) 50 MCG/ACT nasal spray 1 spray by Each Nostril route daily Yes GARY Parson - ZARA   amoxicillin-clavulanate (AUGMENTIN) 875-125 MG per tablet Take 1 tablet by mouth 2 times daily for 7 days  Claudia Caceres MD   Probiotic Acidophilus [facility]) TABS Take 1 tablet by mouth 2 times daily for 15 days  Claudia Caceres MD   omeprazole (PRILOSEC) 40 MG delayed release capsule Take 1 capsule by mouth every morning (before breakfast)  Henry Cruz MD   amLODIPine (NORVASC) 2.5 MG tablet Take 1 tablet by mouth daily  Henry Cruz MD   Omega-3 Fatty Acids (FISH OIL) 1000 MG CAPS Take 1,000 mg by mouth 3 times daily  Historical Provider, MD   Multiple Vitamins-Minerals ([address] 50+ PO) Take by mouth  Historical Provider, MD   Coenzyme Q10 (COQ-10) 200 MG CAPS Take by mouth  Historical Provider, MD   aspirin 81 MG tablet Take 81 mg by mouth daily  Historical Provider, MD   Cholecalciferol (VITAMIN D3) 1000 UNITS TABS Take by mouth  Historical Provider, MD   Multiple Vitamins-Minerals (SUPER ANTHONY KALIA 75/BETA DE LA FUENTE PO) Take by mouth prostate vit. Historical Provider, MD       No past medical history on file. No past surgical history on file. Social History     Socioeconomic History    Marital status:      Spouse name: Not on file    Number of children: Not on file    Years of education: Not on file    Highest education level: Not on file   Occupational History    Not on file   Social Needs    Financial resource strain: Not on file    Food insecurity     Worry: Not on file     Inability: Not on file    Transportation needs     Medical: Not on file     Non-medical: Not on file   Tobacco Use    Smoking status: Former Smoker     Packs/day: 2.00     Years: 27.00     Pack years: 54.00     Start date:      Last attempt to quit: 1986     Years since quittin.6    Smokeless tobacco: Never Used   Substance and Sexual Activity    Alcohol use: Yes    Drug use: Not on file    Sexual activity: Not on file   Lifestyle    Physical activity     Days per week: Not on file     Minutes per session: Not on file    Stress: Not on file   Relationships    Social connections     Talks on phone: Not on file     Gets together: Not on file     Attends Anglican service: Not on file     Active member of club or organization: Not on file     Attends meetings of clubs or organizations: Not on file     Relationship status: Not on file    Intimate partner violence     Fear of current or ex partner: Not on file     Emotionally abused: Not on file     Physically abused: Not on file     Forced sexual activity: Not on file   Other Topics Concern    Not on file   Social History Narrative    Not on file     No family history on file. No Known Allergies    PMH, Surgical Hx, Family Hx, and Social Hx reviewed and updated. Health Maintenance reviewed.     PHYSICAL EXAMINATION:  \"[x]\" Indicates a positive item  \"[]\" Indicates a negative item    Vital Signs: (As obtained by patient/caregiver or practitioner observation)    Blood pressure-  Heart rate-    Respiratory rate-    Temperature-  Pulse oximetry-     Constitutional: [] Appears well-developed and well-nourished [x] No apparent distress      [] Abnormal-   Mental status  [x] Alert and awake  [x] Oriented to person/place/time [x]Able to follow commands      Eyes:  EOM    []  Normal  [] Abnormal-  Sclera  []  Normal  [] Abnormal -         Discharge []  None visible  [] Abnormal -    HENT:   [] Normocephalic, atraumatic.   [] Abnormal   [] Mouth/Throat: Mucous membranes are moist.     External Ears [] Normal  [] Abnormal-     Neck: [] No visualized mass     Pulmonary/Chest: [x] Respiratory effort normal.  [] No visualized signs of difficulty breathing or respiratory distress        [] Abnormal-      Musculoskeletal:   [] Normal gait with no signs of ataxia         [] Normal range of motion of neck        [] Abnormal-       Neurological:       [] No Facial Asymmetry (Cranial nerve 7 motor function) (limited exam to video visit)          [] No gaze palsy        [] Abnormal-         Skin:        [] No significant exanthematous lesions or discoloration noted on facial skin         [] Abnormal-            Psychiatric:       [] Normal Affect [x] No Hallucinations        [] Abnormal-     Other pertinent observable physical exam findings-   Results for orders placed or performed in visit on 02/07/19   Comprehensive Metabolic Panel   Result Value Ref Range    Sodium 143 135 - 144 mEq/L    Potassium 4.8 3.4 - 4.9 mEq/L    Chloride 100 95 - 107 mEq/L    CO2 27 20 - 31 mEq/L    Anion Gap 16 (H) 9 - 15 mEq/L    Glucose 101 (H) 70 - 99 mg/dL    BUN 26 (H) 8 - 23 mg/dL    CREATININE 0.89 0.70 - 1.20 mg/dL    GFR Non-African American >60.0 >60    GFR  >60.0 >60    Calcium 10.7 (H) 8.5 - 9.9 mg/dL    Total Protein 7.8 6.3 - 8.0 g/dL    Alb 4.5 3.5 - 4.6 g/dL    Total Bilirubin <0.2 0.2 - 0.7 mg/dL    Alkaline Phosphatase 57 35 - 104 U/L    ALT 20 0 - 41 U/L    AST 19 0 - 40 U/L    Globulin 3.3 2.3 - 3.5 g/dL   Lipase   Result Value Ref Range    Lipase 34 12 - 95 U/L   Urinalysis   Result Value Ref Range    Color, UA Yellow Straw/Yellow    Clarity, UA Clear Clear    Glucose, Ur Negative Negative mg/dL    Bilirubin Urine Negative Negative    Ketones, Urine Negative Negative mg/dL    Specific Gravity, UA 1.021 1.005 - 1.030    Blood, Urine Negative Negative    pH, UA 5.0 5.0 - 9.0    Protein, UA Negative Negative mg/dL    Urobilinogen, Urine 0.2 <2.0 E.U./dL    Nitrite, Urine Negative Negative    Leukocyte Esterase, Urine Negative Negative   CBC Auto Differential   Result Value Ref Range    WBC 6.6 4.8 - 10.8 K/uL    RBC 4.97 4.70 - 6.10 M/uL    Hemoglobin 16.1 14.0 - 18.0 g/dL    Hematocrit 48.0 42.0 - 52.0 %    MCV 96.5 80.0 - 100.0 fL    MCH 32.3 (H) 27.0 - 31.3 pg    MCHC 33.5 33.0 - 37.0 %    RDW 13.9 11.5 - 14.5 %    Platelets 833 466 - 022 K/uL    Neutrophils % 60.6 %    Lymphocytes % 29.9 %    Monocytes % 7.0 %    Eosinophils % 1.7 %    Basophils % 0.8 %    Neutrophils Absolute 4.0 1.4 - 6.5 K/uL    Lymphocytes Absolute 2.0 1.0 - 4.8 K/uL    Monocytes Absolute 0.5 0.2 - 0.8 K/uL    Eosinophils Absolute 0.1 0.0 - 0.7 K/uL    Basophils Absolute 0.1 0.0 - 0.2 K/uL       ASSESSMENT/PLAN:  Assessment & Plan     Since antibiotics are working will send in 5 more days of them  Instructed pt to eat yogurt and continue the probiotic previously ordered  Instructed him to use Flonase and saline spray  He wants tested for COVID, will place order  Explained that he needs to see his PCP and he understands that but stated he was unable to make an appt, the MA scheduled him an appt with his PCP prior to hanging up. Diagnoses and all orders for this visit:    Acute bacterial sinusitis  -     amoxicillin-clavulanate (AUGMENTIN) 875-125 MG per tablet;  Take 1 tablet by mouth 2 times daily for 5 days  -     fluticasone (FLONASE) 50 MCG/ACT nasal spray; 1 spray by Each Nostril route daily    Chigamals  -     COVID-19 Ambulatory; Future      Orders Placed This Encounter   Procedures    COVID-19 Ambulatory     Standing Status:   Future     Number of Occurrences:   1     Standing Expiration Date:   2021     Scheduling Instructions:      Saline media preferred given current shortage of viral transport media but both acceptable     Order Specific Question:   Is this test for diagnosis or screening? Answer:   Diagnosis of ill patient     Order Specific Question:   Symptomatic for COVID-19 as defined by CDC? Answer:   Yes     Order Specific Question:   Date of Symptom Onset     Answer:   2020     Order Specific Question:   Hospitalized for COVID-19? Answer:   No     Order Specific Question:   Admitted to ICU for COVID-19? Answer:   No     Order Specific Question:   Employed in healthcare setting? Answer:   No     Order Specific Question:   Resident in a congregate (group) care setting? Answer:   No     Order Specific Question:   Pregnant: Answer:   No     Order Specific Question:   Previously tested for COVID-19? Answer:   No     Orders Placed This Encounter   Medications    amoxicillin-clavulanate (AUGMENTIN) 875-125 MG per tablet     Sig: Take 1 tablet by mouth 2 times daily for 5 days     Dispense:  10 tablet     Refill:  0    fluticasone (FLONASE) 50 MCG/ACT nasal spray     Si spray by Each Nostril route daily     Dispense:  1 Bottle     Refill:  0     There are no discontinued medications. No follow-ups on file. Reviewed with the patient: current clinical status, medications, activities and diet. Side effects, adverse effects of the medication prescribed today, as well as treatment plan/ rationale and result expectations have been discussed with the patient who expresses understanding and desires to proceed. Close follow up to evaluate treatment results and for coordination of care.   I have reviewed the patient's medical history in detail and updated the computerized patient record. Patient identification was verified at the start of the visit: Yes    Total time spent on this encounter: 15 minutes      --GARY Walters CNP on 9/2/2020 at 1:59 PM    An electronic signature was used to authenticate this note.

## 2020-09-02 NOTE — TELEPHONE ENCOUNTER
Pt called wanting appt, he states he has a fever, sinus drainage, and diarrhea. Left a message with Nurse Triage due to high wait time, gave   all info needed to call patient back.  Pt advised a nurse will call him, to triage and give scheduling options, psc/sc

## 2020-09-02 NOTE — TELEPHONE ENCOUNTER
Patient called [facility]) to schedule appointment with red flag complaint; left message for Nurse Access for triage by [facility]    Attempted to return call. No answer. Left message. No contact, call not triaged. Please do not respond to the triage nurse through this encounter. Any subsequent communication should be directly with the patient. Reason for Disposition   Message left on unidentified voice mail. Phone number verified.     Protocols used: NO CONTACT OR DUPLICATE CONTACT CALL-ADULT-[place]

## 2020-09-02 NOTE — TELEPHONE ENCOUNTER
Reason for Disposition   Patient wants to be seen    Answer Assessment - Initial Assessment Questions  1. ONSET: \"When did the pain start? \" (e.g., minutes, hours, days)      *No Answer*  2. ONSET: \"Does the pain come and go, or has it been constant since it started? \" (e.g., constant, intermittent, fleeting)      *No Answer*  3. SEVERITY: \"How bad is the pain? \"   (Scale 1-10; mild, moderate or severe)    - MILD (1-3): doesn't interfere with normal activities     - MODERATE (4-7): interferes with normal activities or awakens from sleep     - SEVERE (8-10): excruciating pain, unable to do any normal activities       *No Answer*  4. LOCATION: \"Where does it hurt? \"       Between eye  5. RASH: \"Is there any redness, rash, or swelling of the face? \"      *No Answer*  6. FEVER: \"Do you have a fever? \" If so, ask: \"What is it, how was it measured, and when did it start? \"       unsure  7. OTHER SYMPTOMS: \"Do you have any other symptoms? \" (e.g., fever, toothache, nasal discharge, nasal congestion, clicking sensation in jaw joint)      *No Answer*  8. PREGNANCY: \"Is there any chance you are pregnant? \" \"When was your last menstrual period? \"      *No Answer*    Protocols used:  FEVER-ADULT-[place], FACE PAIN-ADULT-[place]    Pt c/o fatigue, possible fever no thermometer at home, pt also has sinus drainage, pt taking antibiotics for possible dental abcess  Call transferred to master

## 2020-09-08 LAB
SARS-COV-2: NOT DETECTED
SOURCE: NORMAL

## 2020-10-17 ENCOUNTER — OFFICE VISIT (OUTPATIENT)
Dept: FAMILY MEDICINE CLINIC | Age: 70
End: 2020-10-17
Payer: MEDICAID

## 2020-10-17 VITALS
HEIGHT: 78 IN | WEIGHT: 225 LBS | HEART RATE: 86 BPM | DIASTOLIC BLOOD PRESSURE: 82 MMHG | BODY MASS INDEX: 26.03 KG/M2 | OXYGEN SATURATION: 98 % | TEMPERATURE: 98.5 F | SYSTOLIC BLOOD PRESSURE: 146 MMHG

## 2020-10-17 DIAGNOSIS — N30.00 ACUTE CYSTITIS WITHOUT HEMATURIA: ICD-10-CM

## 2020-10-17 LAB
BILIRUBIN, POC: NORMAL
BLOOD URINE, POC: NORMAL
CLARITY, POC: CLEAR
COLOR, POC: NORMAL
GLUCOSE URINE, POC: NORMAL
KETONES, POC: NORMAL
LEUKOCYTE EST, POC: NORMAL
NITRITE, POC: NORMAL
PH, POC: 6
PROTEIN, POC: NORMAL
SPECIFIC GRAVITY, POC: 1.01
UROBILINOGEN, POC: 0.2

## 2020-10-17 PROCEDURE — 99213 OFFICE O/P EST LOW 20 MIN: CPT | Performed by: NURSE PRACTITIONER

## 2020-10-17 PROCEDURE — 81002 URINALYSIS NONAUTO W/O SCOPE: CPT | Performed by: NURSE PRACTITIONER

## 2020-10-17 RX ORDER — CIPROFLOXACIN 500 MG/1
500 TABLET, FILM COATED ORAL 2 TIMES DAILY
Qty: 24 TABLET | Refills: 0 | Status: SHIPPED | OUTPATIENT
Start: 2020-10-17 | End: 2020-10-24 | Stop reason: SDUPTHER

## 2020-10-17 SDOH — ECONOMIC STABILITY: TRANSPORTATION INSECURITY
IN THE PAST 12 MONTHS, HAS THE LACK OF TRANSPORTATION KEPT YOU FROM MEDICAL APPOINTMENTS OR FROM GETTING MEDICATIONS?: NO

## 2020-10-17 SDOH — ECONOMIC STABILITY: TRANSPORTATION INSECURITY
IN THE PAST 12 MONTHS, HAS LACK OF TRANSPORTATION KEPT YOU FROM MEETINGS, WORK, OR FROM GETTING THINGS NEEDED FOR DAILY LIVING?: NO

## 2020-10-17 SDOH — ECONOMIC STABILITY: FOOD INSECURITY: WITHIN THE PAST 12 MONTHS, THE FOOD YOU BOUGHT JUST DIDN'T LAST AND YOU DIDN'T HAVE MONEY TO GET MORE.: NEVER TRUE

## 2020-10-17 SDOH — ECONOMIC STABILITY: FOOD INSECURITY: WITHIN THE PAST 12 MONTHS, YOU WORRIED THAT YOUR FOOD WOULD RUN OUT BEFORE YOU GOT MONEY TO BUY MORE.: NEVER TRUE

## 2020-10-17 ASSESSMENT — ENCOUNTER SYMPTOMS
SINUS PAIN: 0
BACK PAIN: 0
DIARRHEA: 0
NAUSEA: 0
COUGH: 0
SORE THROAT: 0
RHINORRHEA: 0
VOMITING: 0
SINUS PRESSURE: 0

## 2020-10-17 ASSESSMENT — PATIENT HEALTH QUESTIONNAIRE - PHQ9
SUM OF ALL RESPONSES TO PHQ9 QUESTIONS 1 & 2: 0
SUM OF ALL RESPONSES TO PHQ QUESTIONS 1-9: 0
SUM OF ALL RESPONSES TO PHQ QUESTIONS 1-9: 0
1. LITTLE INTEREST OR PLEASURE IN DOING THINGS: 0
SUM OF ALL RESPONSES TO PHQ QUESTIONS 1-9: 0
2. FEELING DOWN, DEPRESSED OR HOPELESS: 0

## 2020-10-17 NOTE — PROGRESS NOTES
Subjective:      Patient ID: Bisi Finley is a 69 y.o. male who presents today for:  Chief Complaint   Patient presents with    Urinary Tract Infection     patient here with frequency, little burning        HPI    Hesitation with urine to come out  Having to urinate every 15 minutes  Feel like its coming out good though when it does come out  Just dont feel good   Pressure in the pelvic area   Up all night because he kept having to urinate  Little burning with urination  No blood in the urine          No past medical history on file. No past surgical history on file. Social History     Socioeconomic History    Marital status:      Spouse name: Not on file    Number of children: Not on file    Years of education: Not on file    Highest education level: Not on file   Occupational History    Not on file   Social Needs    Financial resource strain: Not on file    Food insecurity     Worry: Never true     Inability: Never true    Transportation needs     Medical: No     Non-medical: No   Tobacco Use    Smoking status: Former Smoker     Packs/day: 2.00     Years: 27.00     Pack years: 54.00     Start date:      Last attempt to quit: 1986     Years since quittin.8    Smokeless tobacco: Never Used   Substance and Sexual Activity    Alcohol use:  Yes    Drug use: Not on file    Sexual activity: Not on file   Lifestyle    Physical activity     Days per week: Not on file     Minutes per session: Not on file    Stress: Not on file   Relationships    Social connections     Talks on phone: Not on file     Gets together: Not on file     Attends Sikhism service: Not on file     Active member of club or organization: Not on file     Attends meetings of clubs or organizations: Not on file     Relationship status: Not on file    Intimate partner violence     Fear of current or ex partner: Not on file     Emotionally abused: Not on file     Physically abused: Not on file     Forced sexual activity: Not on file   Other Topics Concern    Not on file   Social History Narrative    Not on file     No family history on file. No Known Allergies  Current Outpatient Medications   Medication Sig Dispense Refill    ciprofloxacin (CIPRO) 500 MG tablet Take 1 tablet by mouth 2 times daily for 12 days 24 tablet 0    fluticasone (FLONASE) 50 MCG/ACT nasal spray 1 spray by Each Nostril route daily 1 Bottle 0    omeprazole (PRILOSEC) 40 MG delayed release capsule Take 1 capsule by mouth every morning (before breakfast) 90 capsule 0    amLODIPine (NORVASC) 2.5 MG tablet Take 1 tablet by mouth daily 30 tablet 1    Omega-3 Fatty Acids (FISH OIL) 1000 MG CAPS Take 1,000 mg by mouth 3 times daily      Multiple Vitamins-Minerals (SUNVITE ACTIVE ADULT 50+ PO) Take by mouth      Coenzyme Q10 (COQ-10) 200 MG CAPS Take by mouth      aspirin 81 MG tablet Take 81 mg by mouth daily      Cholecalciferol (VITAMIN D3) 1000 UNITS TABS Take by mouth      Multiple Vitamins-Minerals (SUPER ANTHONY KALIA 75/BETA DE LA FUENTE PO) Take by mouth prostate vit. No current facility-administered medications for this visit. Review of Systems   Constitutional: Negative for chills and fever. HENT: Negative for congestion, rhinorrhea, sinus pressure, sinus pain and sore throat. Respiratory: Negative for cough. Gastrointestinal: Negative for diarrhea, nausea and vomiting. Genitourinary: Positive for difficulty urinating, dysuria and frequency. Negative for flank pain, hematuria, penile swelling, scrotal swelling and testicular pain. Musculoskeletal: Negative for arthralgias, back pain and myalgias. Objective:   BP (!) 146/82 (Site: Left Upper Arm, Position: Sitting, Cuff Size: Large Adult)   Pulse 86   Temp 98.5 °F (36.9 °C) (Temporal)   Ht 6' 8\" (2.032 m)   Wt 225 lb (102.1 kg)   SpO2 98%   BMI 24.72 kg/m²     Physical Exam  Vitals signs reviewed. Constitutional:       Appearance: Normal appearance.

## 2020-10-17 NOTE — PATIENT INSTRUCTIONS
or liver disease and have to limit fluids, talk with your doctor before you increase the amount of fluids you drink. · Urinate when you have the urge. Do not hold your urine for a long time. Urinate before you go to sleep. · Keep your penis clean. Catheter care  If you have a drainage tube (catheter) in place, the following steps will help you care for it. · Always wash your hands before and after touching your catheter. · Check the area around the urethra for inflammation or signs of infection. Signs of infection include irritated, swollen, red, or tender skin, or pus around the catheter. · Clean the area around the catheter with soap and water two times a day. Dry with a clean towel afterward. · Do not apply powder or lotion to the skin around the catheter. To empty the urine collection bag   · Wash your hands with soap and water. · Without touching the drain spout, remove the spout from its sleeve at the bottom of the collection bag. Open the valve on the spout. · Let the urine flow out of the bag and into the toilet or a container. Do not let the tubing or drain spout touch anything. · After you empty the bag, clean the end of the drain spout with tissue and water. Close the valve and put the drain spout back into its sleeve at the bottom of the collection bag. · Wash your hands with soap and water. When should you call for help? Call your doctor now or seek immediate medical care if:    · Symptoms such as a fever, chills, nausea, or vomiting get worse or happen for the first time.     · You have new pain in your back just below your rib cage. This is called flank pain.     · There is new blood or pus in your urine.     · You are not able to take or keep down your antibiotics. Watch closely for changes in your health, and be sure to contact your doctor if:    · You are not getting better after taking an antibiotic for 2 days.     · Your symptoms go away but then come back.    Where can you learn more?  Go to [email or web address] and sign in to your [employer] account. Enter D797 in the [facility] box to learn more about \"Urinary Tract Infections in Men: Care Instructions. \"     If you do not have an account, please click on the \"Sign Up Now\" link. Current as of: June 29, 2020               Content Version: 12.6  © 2006-2020 [employer]. Care instructions adapted under license by [facility] ([facility]). If you have questions about a medical condition or this instruction, always ask your healthcare professional. [place] [address] any warranty or liability for your use of this information.

## 2020-10-18 ENCOUNTER — HOSPITAL ENCOUNTER (EMERGENCY)
Age: 70
Discharge: HOME | End: 2020-10-18
Attending: EMERGENCY MEDICINE

## 2020-10-18 ENCOUNTER — APPOINTMENT (OUTPATIENT)
Dept: GENERAL RADIOLOGY | Age: 70
End: 2020-10-18

## 2020-10-18 VITALS
RESPIRATION RATE: 19 BRPM | DIASTOLIC BLOOD PRESSURE: 89 MMHG | TEMPERATURE: 96.7 F | BODY MASS INDEX: 26.03 KG/M2 | OXYGEN SATURATION: 98 % | SYSTOLIC BLOOD PRESSURE: 143 MMHG | HEART RATE: 75 BPM | HEIGHT: 78 IN | WEIGHT: 225 LBS

## 2020-10-18 LAB
BILIRUBIN URINE: NEGATIVE
BLOOD, URINE: NEGATIVE
CLARITY: CLEAR
COLOR: YELLOW
GLUCOSE URINE: NEGATIVE MG/DL
KETONES, URINE: NEGATIVE MG/DL
LEUKOCYTE ESTERASE, URINE: NEGATIVE
NITRITE, URINE: NEGATIVE
PH UA: 5 (ref 5–9)
PROTEIN UA: NEGATIVE MG/DL
SPECIFIC GRAVITY UA: 1.01 (ref 1–1.03)
URINE REFLEX TO CULTURE: NORMAL
UROBILINOGEN, URINE: 0.2 E.U./DL

## 2020-10-18 PROCEDURE — 81003 URINALYSIS AUTO W/O SCOPE: CPT

## 2020-10-18 PROCEDURE — 74018 RADEX ABDOMEN 1 VIEW: CPT

## 2020-10-18 PROCEDURE — 99283 EMERGENCY DEPT VISIT LOW MDM: CPT

## 2020-10-18 PROCEDURE — 51798 US URINE CAPACITY MEASURE: CPT

## 2020-10-18 ASSESSMENT — ENCOUNTER SYMPTOMS
EYE PAIN: 0
CHEST TIGHTNESS: 0
NAUSEA: 0
SORE THROAT: 0
DIARRHEA: 1
VOMITING: 0
SHORTNESS OF BREATH: 0
ABDOMINAL PAIN: 1

## 2020-10-18 ASSESSMENT — PAIN SCALES - GENERAL: PAINLEVEL_OUTOF10: 6

## 2020-10-18 ASSESSMENT — PAIN DESCRIPTION - LOCATION: LOCATION: ABDOMEN

## 2020-10-18 ASSESSMENT — PAIN DESCRIPTION - ORIENTATION: ORIENTATION: LOWER

## 2020-10-18 NOTE — ED NOTES
Patient voided and urine specimen obtained. Patient placed in hospital gown. Patient bladder scanned with 0ml result. Patient ambulatory to radiology.       Shannan Colvin RN  10/18/20 [id number]

## 2020-10-18 NOTE — ED NOTES
Patient back from CT , family member is sitting with the patient in the room. Pt is requesting blood test to be done, pt states \"Am I going to get blood test , since I am sitting here I might as well, they told me at the urgent care I would get one\".  Dr Bren Hagen aware of the patient request.      Jason Joshua, RN  10/18/20 900 [facility], RN  10/18/20 1148
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
## 2020-10-18 NOTE — ED NOTES
Dr Uriel Butler spoke to the patient regarding his request, pt declines a blood test at this time. Mag citrate ordered.       Bharat Nieves, RN  10/18/20 Yelena Murphy, RN  10/18/20 2154

## 2020-10-18 NOTE — ED NOTES
Patient is requesting to take the Mag citrate that was order by Dr Clyde Acosta home, he states \"I would feel more comfortable taking it at home\". Dr Clyde Acosta is aware and is fine with the decision. Updated the patient that we are currently waiting for the UA at this time.       Tereza Peñaloza RN  10/18/20 2173

## 2020-10-18 NOTE — ED TRIAGE NOTES
Pt to ER with c/o lower abd pain and urinary frequency that started on Friday, pt states he went to walk in and was put on cipro , pt states his bad pain is 6/10, pt a&ox4, resp even and unlabored, pt denies vomiting and diarrhea

## 2020-10-19 ENCOUNTER — APPOINTMENT (OUTPATIENT)
Dept: CT IMAGING | Age: 70
End: 2020-10-19

## 2020-10-19 ENCOUNTER — HOSPITAL ENCOUNTER (EMERGENCY)
Age: 70
Discharge: HOME | End: 2020-10-19
Attending: EMERGENCY MEDICINE
Payer: MEDICAID

## 2020-10-19 VITALS
WEIGHT: 220 LBS | RESPIRATION RATE: 20 BRPM | HEIGHT: 78 IN | BODY MASS INDEX: 25.45 KG/M2 | HEART RATE: 68 BPM | DIASTOLIC BLOOD PRESSURE: 84 MMHG | SYSTOLIC BLOOD PRESSURE: 122 MMHG | TEMPERATURE: 98.2 F | OXYGEN SATURATION: 100 %

## 2020-10-19 LAB
ALBUMIN SERPL-MCNC: 4.4 G/DL (ref 3.5–4.6)
ALP BLD-CCNC: 52 U/L (ref 35–104)
ALT SERPL-CCNC: 22 U/L (ref 0–41)
ANION GAP SERPL CALCULATED.3IONS-SCNC: 11 MEQ/L (ref 9–15)
AST SERPL-CCNC: 18 U/L (ref 0–40)
BASOPHILS ABSOLUTE: 0.1 K/UL (ref 0–0.2)
BASOPHILS RELATIVE PERCENT: 0.9 %
BILIRUB SERPL-MCNC: 0.6 MG/DL (ref 0.2–0.7)
BUN BLDV-MCNC: 16 MG/DL (ref 8–23)
CALCIUM SERPL-MCNC: 9.8 MG/DL (ref 8.5–9.9)
CHLORIDE BLD-SCNC: 102 MEQ/L (ref 95–107)
CO2: 27 MEQ/L (ref 20–31)
CREAT SERPL-MCNC: 0.82 MG/DL (ref 0.7–1.2)
EKG ATRIAL RATE: 75 BPM
EKG P AXIS: 32 DEGREES
EKG P-R INTERVAL: 160 MS
EKG Q-T INTERVAL: 378 MS
EKG QRS DURATION: 112 MS
EKG QTC CALCULATION (BAZETT): 422 MS
EKG R AXIS: 4 DEGREES
EKG T AXIS: 46 DEGREES
EKG VENTRICULAR RATE: 75 BPM
EOSINOPHILS ABSOLUTE: 0 K/UL (ref 0–0.7)
EOSINOPHILS RELATIVE PERCENT: 0.8 %
GFR AFRICAN AMERICAN: >60
GFR AFRICAN AMERICAN: >60
GFR NON-AFRICAN AMERICAN: >60
GFR NON-AFRICAN AMERICAN: >60
GLOBULIN: 2.3 G/DL (ref 2.3–3.5)
GLUCOSE BLD-MCNC: 106 MG/DL (ref 70–99)
HCT VFR BLD CALC: 46.5 % (ref 42–52)
HEMOGLOBIN: 15.5 G/DL (ref 14–18)
INR BLD: 0.9
LACTIC ACID: 1.4 MMOL/L (ref 0.5–2.2)
LIPASE: 23 U/L (ref 12–95)
LYMPHOCYTES ABSOLUTE: 1.5 K/UL (ref 1–4.8)
LYMPHOCYTES RELATIVE PERCENT: 25.4 %
MAGNESIUM: 2.1 MG/DL (ref 1.7–2.4)
MCH RBC QN AUTO: 31.2 PG (ref 27–31.3)
MCHC RBC AUTO-ENTMCNC: 33.2 % (ref 33–37)
MCV RBC AUTO: 94 FL (ref 80–100)
MONOCYTES ABSOLUTE: 0.4 K/UL (ref 0.2–0.8)
MONOCYTES RELATIVE PERCENT: 6 %
NEUTROPHILS ABSOLUTE: 4 K/UL (ref 1.4–6.5)
NEUTROPHILS RELATIVE PERCENT: 66.9 %
PDW BLD-RTO: 14.1 % (ref 11.5–14.5)
PERFORMED ON: NORMAL
PLATELET # BLD: 243 K/UL (ref 130–400)
POC CREATININE WHOLE BLOOD: 0.9
POC CREATININE: 0.9 MG/DL (ref 0.8–1.3)
POC SAMPLE TYPE: NORMAL
POTASSIUM SERPL-SCNC: 4.2 MEQ/L (ref 3.4–4.9)
PROTHROMBIN TIME: 12.7 SEC (ref 12.3–14.9)
RBC # BLD: 4.95 M/UL (ref 4.7–6.1)
SODIUM BLD-SCNC: 140 MEQ/L (ref 135–144)
TOTAL PROTEIN: 6.7 G/DL (ref 6.3–8)
TROPONIN: <0.01 NG/ML (ref 0–0.01)
URINE CULTURE, ROUTINE: NORMAL
WBC # BLD: 5.9 K/UL (ref 4.8–10.8)

## 2020-10-19 PROCEDURE — 83605 ASSAY OF LACTIC ACID: CPT

## 2020-10-19 PROCEDURE — 99283 EMERGENCY DEPT VISIT LOW MDM: CPT

## 2020-10-19 PROCEDURE — 36415 COLL VENOUS BLD VENIPUNCTURE: CPT

## 2020-10-19 PROCEDURE — 74177 CT ABD & PELVIS W/CONTRAST: CPT

## 2020-10-19 PROCEDURE — 85025 COMPLETE CBC W/AUTO DIFF WBC: CPT

## 2020-10-19 PROCEDURE — 84484 ASSAY OF TROPONIN QUANT: CPT

## 2020-10-19 PROCEDURE — 83735 ASSAY OF MAGNESIUM: CPT

## 2020-10-19 PROCEDURE — 80053 COMPREHEN METABOLIC PANEL: CPT

## 2020-10-19 PROCEDURE — 83690 ASSAY OF LIPASE: CPT

## 2020-10-19 PROCEDURE — 6360000004 HC RX CONTRAST MEDICATION: Performed by: EMERGENCY MEDICINE

## 2020-10-19 PROCEDURE — 93005 ELECTROCARDIOGRAM TRACING: CPT | Performed by: EMERGENCY MEDICINE

## 2020-10-19 PROCEDURE — 85610 PROTHROMBIN TIME: CPT

## 2020-10-19 RX ORDER — ONDANSETRON 2 MG/ML
4 INJECTION INTRAMUSCULAR; INTRAVENOUS ONCE
Status: DISCONTINUED | OUTPATIENT
Start: 2020-10-19 | End: 2020-10-19 | Stop reason: HOSPADM

## 2020-10-19 RX ORDER — MORPHINE SULFATE 2 MG/ML
4 INJECTION, SOLUTION INTRAMUSCULAR; INTRAVENOUS ONCE
Status: DISCONTINUED | OUTPATIENT
Start: 2020-10-19 | End: 2020-10-19 | Stop reason: HOSPADM

## 2020-10-19 RX ORDER — POLYETHYLENE GLYCOL 3350 17 G/17G
17 POWDER, FOR SOLUTION ORAL DAILY PRN
Qty: 510 G | Refills: 0 | Status: SHIPPED | OUTPATIENT
Start: 2020-10-19 | End: 2020-11-18

## 2020-10-19 RX ADMIN — IOPAMIDOL 100 ML: 612 INJECTION, SOLUTION INTRAVENOUS at 09:39

## 2020-10-19 ASSESSMENT — ENCOUNTER SYMPTOMS
SHORTNESS OF BREATH: 0
CHEST TIGHTNESS: 0
EYE PAIN: 0
NAUSEA: 0
ABDOMINAL PAIN: 1
VOMITING: 0
SORE THROAT: 0

## 2020-10-19 ASSESSMENT — PAIN SCALES - GENERAL: PAINLEVEL_OUTOF10: 6

## 2020-10-19 ASSESSMENT — PAIN DESCRIPTION - PAIN TYPE: TYPE: ACUTE PAIN

## 2020-10-19 ASSESSMENT — PAIN DESCRIPTION - LOCATION: LOCATION: ABDOMEN

## 2020-10-19 ASSESSMENT — PAIN DESCRIPTION - ORIENTATION: ORIENTATION: LOWER

## 2020-10-19 NOTE — ED TRIAGE NOTES
Pt here with complaints of lower abdominal pain for the last three days. He was seen in ER yesterday and sent home with meds with no relief. He states the pain is in his lower abdomen; describes it as pressure. The pain is worse with palpitation. Pt reports always feeling the urge that he needs to urinate.

## 2020-10-19 NOTE — ED PROVIDER NOTES
[address] [facility] [place] [place] ED  EMERGENCY DEPARTMENT ENCOUNTER      Pt Name: Jessica Christensen  MRN: [id number]  Zainabgfkatt 1950  Date of evaluation: 10/19/2020  Provider: Titi Johnson DO    CHIEF COMPLAINT       Chief Complaint   Patient presents with    Abdominal Pain     lower abdomen         HISTORY OF PRESENT ILLNESS   (Location/Symptom, Timing/Onset, Context/Setting, Quality, Duration, Modifying Factors, Severity)  Note limiting factors. Jessica Christensen is a 69 y.o. male who presents to the emergency department . This patient comes in with lower abdominal discomfort. He was seen yesterday for this discomfort as well as urinary frequency. He was treated with some magnesium citrate for some retained stool and really has not had much of a result. He comes back today because of the persistent abdominal discomfort. The urinary frequency does not seem to be as much of an issue but at times he feels that he is having trouble urinating. He has seen Dr. Sandy Riddle in past.    HPI    Nursing Notes were reviewed. REVIEW OF SYSTEMS    (2-9 systems for level 4, 10 or more for level 5)     Review of Systems   Constitutional: Negative for activity change, appetite change and fatigue. HENT: Negative for congestion and sore throat. Eyes: Negative for pain and visual disturbance. Respiratory: Negative for chest tightness and shortness of breath. Cardiovascular: Negative for chest pain. Gastrointestinal: Positive for abdominal pain. Negative for nausea and vomiting. Endocrine: Negative for polydipsia. Genitourinary: Positive for difficulty urinating and frequency. Negative for flank pain and urgency. Musculoskeletal: Negative for gait problem and neck stiffness. Skin: Negative for rash. Neurological: Negative for weakness, light-headedness and headaches. Psychiatric/Behavioral: Negative for confusion and sleep disturbance.        Except as noted above the remainder of the review of systems was reviewed and negative. PAST MEDICAL HISTORY   History reviewed. No pertinent past medical history. SURGICAL HISTORY     History reviewed. No pertinent surgical history. CURRENT MEDICATIONS       Previous Medications    AMLODIPINE (NORVASC) 2.5 MG TABLET    Take 1 tablet by mouth daily    ASPIRIN 81 MG TABLET    Take 81 mg by mouth daily    CHOLECALCIFEROL (VITAMIN D3) 1000 UNITS TABS    Take by mouth    CIPROFLOXACIN (CIPRO) 500 MG TABLET    Take 1 tablet by mouth 2 times daily for 12 days    COENZYME Q10 (COQ-10) 200 MG CAPS    Take by mouth    FLUTICASONE (FLONASE) 50 MCG/ACT NASAL SPRAY    1 spray by Each Nostril route daily    MULTIPLE VITAMINS-MINERALS (SUNVITE ACTIVE ADULT 50+ PO)    Take by mouth    MULTIPLE VITAMINS-MINERALS (SUPER ANTHONY KALIA 75/BETA DE L AFUENTE PO)    Take by mouth prostate vit. OMEGA-3 FATTY ACIDS (FISH OIL) 1000 MG CAPS    Take 1,000 mg by mouth 3 times daily    OMEPRAZOLE (PRILOSEC) 40 MG DELAYED RELEASE CAPSULE    Take 1 capsule by mouth every morning (before breakfast)       ALLERGIES     Patient has no known allergies. FAMILY HISTORY     History reviewed. No pertinent family history.        SOCIAL HISTORY       Social History     Socioeconomic History    Marital status:      Spouse name: None    Number of children: None    Years of education: None    Highest education level: None   Occupational History    None   Social Needs    Financial resource strain: None    Food insecurity     Worry: Never true     Inability: Never true    Transportation needs     Medical: No     Non-medical: No   Tobacco Use    Smoking status: Former Smoker     Packs/day: 2.00     Years: 27.00     Pack years: 54.00     Start date:      Last attempt to quit: 1986     Years since quittin.8    Smokeless tobacco: Never Used    Tobacco comment: quit 32 years ago   Substance and Sexual Activity    Alcohol use: Yes     Comment: occasionally    Drug use: None    Sexual activity: None   Lifestyle    Physical activity     Days per week: None     Minutes per session: None    Stress: None   Relationships    Social connections     Talks on phone: None     Gets together: None     Attends Synagogue service: None     Active member of club or organization: None     Attends meetings of clubs or organizations: None     Relationship status: None    Intimate partner violence     Fear of current or ex partner: None     Emotionally abused: None     Physically abused: None     Forced sexual activity: None   Other Topics Concern    None   Social History Narrative    None       SCREENINGS                        PHYSICAL EXAM    (up to 7 for level 4, 8 or more for level 5)     ED Triage Vitals [10/19/20 0831]   BP Temp Temp Source Pulse Resp SpO2 Height Weight   138/87 98.2 °F (36.8 °C) Oral 83 16 98 % 6' 8\" (2.032 m) 220 lb (99.8 kg)       Physical Exam  Vitals signs and nursing note reviewed. Constitutional:       General: He is not in acute distress. Appearance: He is well-developed. He is not diaphoretic. HENT:      Head: Normocephalic and atraumatic. Right Ear: External ear normal.      Left Ear: External ear normal.      Mouth/Throat:      Pharynx: No oropharyngeal exudate. Eyes:      Conjunctiva/sclera: Conjunctivae normal.      Pupils: Pupils are equal, round, and reactive to light. Neck:      Musculoskeletal: Normal range of motion and neck supple. Thyroid: No thyromegaly. Vascular: No JVD. Trachea: No tracheal deviation. Cardiovascular:      Rate and Rhythm: Normal rate. Heart sounds: Normal heart sounds. No murmur. Pulmonary:      Effort: Pulmonary effort is normal. No respiratory distress. Breath sounds: Normal breath sounds. No wheezing. Abdominal:      General: Bowel sounds are normal.      Palpations: Abdomen is soft. Tenderness: There is no abdominal tenderness. There is no guarding or rebound.    Musculoskeletal: Normal range of motion. Skin:     General: Skin is warm and dry. Findings: No rash. Neurological:      Mental Status: He is alert and oriented to person, place, and time. Cranial Nerves: No cranial nerve deficit. Psychiatric:         Behavior: Behavior normal.         DIAGNOSTIC RESULTS     EKG: All EKG's are interpreted by the Emergency Department Physician who either signs or Co-signs this chart in the absence of a cardiologist.    Normal sinus rhythm 75 bpm poor R wave progression anteriorly no acute ischemia    RADIOLOGY:   Non-plain film images such as CT, Ultrasound and MRI are read by the radiologist. Plain radiographic images are visualized and preliminarily interpreted by the emergency physician with the below findings:        Interpretation per the Radiologist below, if available at the time of this note:    CT ABDOMEN PELVIS W IV CONTRAST Additional Contrast? None   Final Result      Diverticulosis, sigmoid colon, and to lesser extent, descending colon. All CT scans at this facility use dose modulation, iterative reconstruction, and/or weight based dosing when appropriate to reduce radiation dose to as low as reasonably achievable. ED BEDSIDE ULTRASOUND:   Performed by ED Physician - none    LABS:  Labs Reviewed   COMPREHENSIVE METABOLIC PANEL - Abnormal; Notable for the following components:       Result Value    Glucose 106 (*)     All other components within normal limits   POCT CREATININE - URINE - Normal   CBC WITH AUTO DIFFERENTIAL   LACTIC ACID, PLASMA   LIPASE   MAGNESIUM   PROTIME-INR   TROPONIN   POCT VENOUS       All other labs were within normal range or not returned as of this dictation.     EMERGENCY DEPARTMENT COURSE and DIFFERENTIAL DIAGNOSIS/MDM:   Vitals:    Vitals:    10/19/20 0831 10/19/20 0943   BP: 138/87 122/84   Pulse: 83 68   Resp: 16 20   Temp: 98.2 °F (36.8 °C)    TempSrc: Oral    SpO2: 98% 100%   Weight: 220 lb (99.8 kg)    Height: 6' 8\" (2.032 m) Patient comes in with lower abdominal discomfort and at times difficulty urinating. His CT only shows diverticulosis. His urinalysis 2 days in a row was completely normal.  At this point I will try to get him in with urology. He also should be seen by gastroenterology. MDM      REASSESSMENT          CRITICAL CARE TIME   Total Critical Care time was 0 minutes, excluding separately reportable procedures. There was a high probability of clinically significant/life threatening deterioration in the patient's condition which required my urgent intervention. CONSULTS:  None    PROCEDURES:  Unless otherwise noted below, none     Procedures      FINAL IMPRESSION      1. Lower abdominal pain    2. Constipation, unspecified constipation type    3. Urinary frequency          DISPOSITION/PLAN   DISPOSITION        PATIENT REFERRED TO:  Ursula Rodriguez MD  [address]  [phone]    Schedule an appointment as soon as possible for a visit   10/20/20 tomorrow 1145 AM      DISCHARGE MEDICATIONS:  New Prescriptions    POLYETHYLENE GLYCOL (GLYCOLAX) 17 GM/SCOOP POWDER    Take 17 g by mouth daily as needed (constipation)     Controlled Substances Monitoring:     No flowsheet data found.     (Please note that portions of this note were completed with a voice recognition program.  Efforts were made to edit the dictations but occasionally words are mis-transcribed.)    Rod Germain DO (electronically signed)  Attending Emergency Physician            Rod Germain DO  10/19/20 4528

## 2020-10-20 ENCOUNTER — NURSE TRIAGE (OUTPATIENT)
Dept: OTHER | Facility: CLINIC | Age: 70
End: 2020-10-20

## 2020-10-20 ENCOUNTER — TELEPHONE (OUTPATIENT)
Dept: FAMILY MEDICINE CLINIC | Age: 70
End: 2020-10-20

## 2020-10-20 ENCOUNTER — OFFICE VISIT (OUTPATIENT)
Dept: UROLOGY | Age: 70
End: 2020-10-20

## 2020-10-20 ENCOUNTER — TELEPHONE (OUTPATIENT)
Dept: ADMINISTRATIVE | Age: 70
End: 2020-10-20

## 2020-10-20 VITALS
SYSTOLIC BLOOD PRESSURE: 120 MMHG | HEIGHT: 78 IN | HEART RATE: 67 BPM | WEIGHT: 223 LBS | BODY MASS INDEX: 25.8 KG/M2 | DIASTOLIC BLOOD PRESSURE: 88 MMHG

## 2020-10-20 LAB
BILIRUBIN, POC: NORMAL
BLOOD URINE, POC: NORMAL
CLARITY, POC: CLEAR
COLOR, POC: YELLOW
GLUCOSE URINE, POC: NORMAL
KETONES, POC: NORMAL
LEUKOCYTE EST, POC: NORMAL
NITRITE, POC: NORMAL
PH, POC: 5
POST VOID RESIDUAL (PVR): 58 ML
PROTEIN, POC: NORMAL
SPECIFIC GRAVITY, POC: 1.01
UROBILINOGEN, POC: 0.2

## 2020-10-20 PROCEDURE — G8420 CALC BMI NORM PARAMETERS: HCPCS | Performed by: UROLOGY

## 2020-10-20 PROCEDURE — G8427 DOCREV CUR MEDS BY ELIG CLIN: HCPCS | Performed by: UROLOGY

## 2020-10-20 PROCEDURE — 51798 US URINE CAPACITY MEASURE: CPT | Performed by: UROLOGY

## 2020-10-20 PROCEDURE — 4040F PNEUMOC VAC/ADMIN/RCVD: CPT | Performed by: UROLOGY

## 2020-10-20 PROCEDURE — 1123F ACP DISCUSS/DSCN MKR DOCD: CPT | Performed by: UROLOGY

## 2020-10-20 PROCEDURE — G8484 FLU IMMUNIZE NO ADMIN: HCPCS | Performed by: UROLOGY

## 2020-10-20 PROCEDURE — 81003 URINALYSIS AUTO W/O SCOPE: CPT | Performed by: UROLOGY

## 2020-10-20 PROCEDURE — 99203 OFFICE O/P NEW LOW 30 MIN: CPT | Performed by: UROLOGY

## 2020-10-20 PROCEDURE — 93010 ELECTROCARDIOGRAM REPORT: CPT | Performed by: INTERNAL MEDICINE

## 2020-10-20 PROCEDURE — 1036F TOBACCO NON-USER: CPT | Performed by: UROLOGY

## 2020-10-20 PROCEDURE — 3017F COLORECTAL CA SCREEN DOC REV: CPT | Performed by: UROLOGY

## 2020-10-20 NOTE — TELEPHONE ENCOUNTER
His ct from yesterday didn't comment on constipation  Please verify it's indeed constipation  When was his last bm?

## 2020-10-20 NOTE — PROGRESS NOTES
[facility] EVALUATION NOTE                                                 H&P                                                                                                                                                 Reason for Visit  Burning with ejaculation, increased frequency of urination, constipation    History of Present Illness  Patient was recently seen in emergency room for questionable UTI-like symptoms with frequency urgency and constipation. Patient has also had some burning after ejaculation. CT reviewed no evidence of upper tract lesions no evidence of hydronephrosis no evidence of stones. Prostate is approximately 40 g in size  Patient has not had a PSA in some time  Urine culture done in the emergency room was negative  Current urinalysis is negative  Postvoid residual 58 cc  Patient has taken 2 days of ciprofloxacin      Urologic Review of Systems/Symptoms  As above    Review of Systems  Hospitalization: Recent ER admission  All 14 categories of Review of Systems otherwise reviewed no other findings reported. History reviewed. No pertinent past medical history. History reviewed. No pertinent surgical history.   Social History     Socioeconomic History    Marital status:      Spouse name: None    Number of children: None    Years of education: None    Highest education level: None   Occupational History    None   Social Needs    Financial resource strain: None    Food insecurity     Worry: Never true     Inability: Never true    Transportation needs     Medical: No     Non-medical: No   Tobacco Use    Smoking status: Former Smoker     Packs/day: 2.00     Years: 27.00     Pack years: 54.00     Start date:      Last attempt to quit: 1986     Years since quittin.8    Smokeless tobacco: Never Used    Tobacco comment: quit 32 years ago   Substance and Sexual Activity    Alcohol use: Yes     Comment: occasionally    Drug use: None    Sexual activity: None   Lifestyle    Physical activity     Days per week: None     Minutes per session: None    Stress: None   Relationships    Social connections     Talks on phone: None     Gets together: None     Attends Gnosticism service: None     Active member of club or organization: None     Attends meetings of clubs or organizations: None     Relationship status: None    Intimate partner violence     Fear of current or ex partner: None     Emotionally abused: None     Physically abused: None     Forced sexual activity: None   Other Topics Concern    None   Social History Narrative    None     History reviewed. No pertinent family history. Current Outpatient Medications   Medication Sig Dispense Refill    ZINC PO Take by mouth      Ascorbic Acid (OCTAVIO-C PO) Take by mouth      polyethylene glycol (GLYCOLAX) 17 GM/SCOOP powder Take 17 g by mouth daily as needed (constipation) 510 g 0    Omega-3 Fatty Acids (FISH OIL) 1000 MG CAPS Take 1,000 mg by mouth 3 times daily      Multiple Vitamins-Minerals (SUNVITE ACTIVE ADULT 50+ PO) Take by mouth      Coenzyme Q10 (COQ-10) 200 MG CAPS Take by mouth      aspirin 81 MG tablet Take 81 mg by mouth daily      Cholecalciferol (VITAMIN D3) 1000 UNITS TABS Take by mouth      ciprofloxacin (CIPRO) 500 MG tablet Take 1 tablet by mouth 2 times daily for 12 days (Patient not taking: Reported on 10/20/2020) 24 tablet 0     No current facility-administered medications for this visit. Patient has no known allergies.   All reviewed and verified by Dr Lesli August on today's visit    No results found for: PSA, PSADIA  Results for POC orders placed in visit on 10/20/20   POCT Urinalysis No Micro (Auto)   Result Value Ref Range    Color, UA yellow     Clarity, UA clear     Glucose, UA POC neg     Bilirubin, UA neg     Ketones, UA neg     Spec Grav, UA 1.015     Blood, UA POC neg     pH, UA 5.0     Protein, UA POC neg     Urobilinogen, UA 0.2     Leukocytes, UA neg     Nitrite, UA neg poct post void residual   Result Value Ref Range    post void residual 58 ml    Narrative    A point of care test   Post Void Residual was completed by performing  ultrasound scan of the bladder and  reviewed by Dr Alfonso Shaw       Physical Exam  Vitals:    10/20/20 1158   BP: 120/88   Pulse: 67   Weight: 223 lb (101.2 kg)   Height: 6' 8\" (2.032 m)     Constitutional: Currently not in distress. Head and Neck: No abnormalities no lymphadenopathy  Cardiovascular: Normal rate, BP reviewed. Regular rhythm  Pulmonary/Chest: Normal respiratory effort no wheezing  Abdominal: Not distended. No hernias  Urologic Exam  Circumcised penis mild meatal stenosis. Testis within normal limits mildly atrophic. Normal rectal tone no rectal masses prostate tender but not boggy  No distinct nodule palpated no evidence of malignancy by digital rectal exam of the prostate  Postvoid residual 58 cc  Urinalysis clear  CT reviewed with patient. Musculoskeletal: Ambulatory claiming some back pain. Extremities: No edema  Neurological: Cranial nerves intact no deficits normal gait  Skin: No rashes or ulcers  Psychiatric: Alert oriented x3 normal affect. Assessment/Medical Necessity-Decision Making  Currently working diagnosis is acute prostatitis with frequency urgency  Constipation  BPH with mild obstruction  Plan  Finish antibiotic  Continue with bowel regimen to move bowels  Follow-up 2 weeks to check PSA and voiding symptoms with residual  Current level of ciprofloxacin prescription should be adequate  Greater than 50% of 40 minutes spent consulting patient face-to-face  Orders Placed This Encounter   Procedures    PSA, Diagnostic     Standing Status:   Future     Standing Expiration Date:   10/20/2021    POCT Urinalysis No Micro (Auto)    poct post void residual     No orders of the defined types were placed in this encounter.     Emeterio Lima MD       Please note this report has been partially produced using speech recognition software  And may cause contain errors related to that system including grammar, punctuation and spelling as well as words and phrases that may seem inappropriate. If there are questions or concerns please feel free to contact me to clarify.

## 2020-10-20 NOTE — TELEPHONE ENCOUNTER
LMOM for patient to call me back. He was seen in Urology with [facility] today for UTI like sxs.  Last BM was 10/19/20, note in ER notes

## 2020-10-20 NOTE — TELEPHONE ENCOUNTER
Patient calls back later today. He is in quite a bit of pain. He has been to ER and it is no help. He has had a CT scan, labs.

## 2020-10-20 NOTE — TELEPHONE ENCOUNTER
p was in the ER for abdominal pain and unable to have a bowel movement states he was given miralax and he has taken it 4 times and he still has not been able to use the restroom wants to know if there is something else he can do  Please advise

## 2020-10-20 NOTE — RESULT ENCOUNTER NOTE
Please notify patient that his urine did not grow any bacteria.  If he continues to have problems after the antibiotic please follow up with PCP

## 2020-10-21 ENCOUNTER — TELEPHONE (OUTPATIENT)
Dept: FAMILY MEDICINE CLINIC | Age: 70
End: 2020-10-21

## 2020-10-21 ENCOUNTER — OFFICE VISIT (OUTPATIENT)
Dept: FAMILY MEDICINE CLINIC | Age: 70
End: 2020-10-21
Payer: MEDICAID

## 2020-10-21 VITALS
HEART RATE: 86 BPM | SYSTOLIC BLOOD PRESSURE: 127 MMHG | WEIGHT: 219 LBS | DIASTOLIC BLOOD PRESSURE: 88 MMHG | TEMPERATURE: 97.9 F | HEIGHT: 78 IN | BODY MASS INDEX: 25.34 KG/M2 | RESPIRATION RATE: 15 BRPM | OXYGEN SATURATION: 98 %

## 2020-10-21 PROCEDURE — 99214 OFFICE O/P EST MOD 30 MIN: CPT | Performed by: INTERNAL MEDICINE

## 2020-10-21 RX ORDER — FAMOTIDINE 40 MG/1
40 TABLET, FILM COATED ORAL NIGHTLY PRN
Qty: 30 TABLET | Refills: 1 | Status: SHIPPED | OUTPATIENT
Start: 2020-10-21 | End: 2020-11-23 | Stop reason: CLARIF

## 2020-10-21 ASSESSMENT — ENCOUNTER SYMPTOMS
BACK PAIN: 0
EYE PAIN: 0
ABDOMINAL PAIN: 0
SHORTNESS OF BREATH: 0

## 2020-10-21 NOTE — PROGRESS NOTES
Subjective:      Patient ID: Franklyn Romano is a 69 y.o. male who presents today with:  Chief Complaint   Patient presents with    Follow-up    Constipation     last solid BM x 3 days ago, patient still c/o discomfort     Urinary Tract Infection     urinary frequency, followed with [facility] 10/20/20    Immunizations     declined today        HPI    Last Friday  Feels about 15 percent better since Friday. Started urinating more frequently  Saturday woke up with abdominal discomfort, bad  Went to walk in clinic   ua and urine culture done at walk in clinic, started cipro  Didn't feel any better  Then went to ER  Because the pain was worse  Was told he was constipated  He was given medication for constipation  Just had liquid bowel movements  Still had pain  So went back to ER  Ct abdomen and xrays of abdomen done  miralax and mineral oil. Just this morning had a bowel movement, soft and lose  Felt a little better  Still discomfort with urinating    History reviewed. No pertinent past medical history. No past surgical history on file.   Social History     Socioeconomic History    Marital status:      Spouse name: Not on file    Number of children: Not on file    Years of education: Not on file    Highest education level: Not on file   Occupational History    Not on file   Social Needs    Financial resource strain: Not on file    Food insecurity     Worry: Never true     Inability: Never true    Transportation needs     Medical: No     Non-medical: No   Tobacco Use    Smoking status: Former Smoker     Packs/day: 2.00     Years: 27.00     Pack years: 54.00     Start date:      Last attempt to quit: 1986     Years since quittin.8    Smokeless tobacco: Never Used    Tobacco comment: quit 32 years ago   Substance and Sexual Activity    Alcohol use: Yes     Comment: occasionally    Drug use: Not on file    Sexual activity: Not on file   Lifestyle    Physical activity     Days per week: Not on file     Minutes per session: Not on file    Stress: Not on file   Relationships    Social connections     Talks on phone: Not on file     Gets together: Not on file     Attends Restorationism service: Not on file     Active member of club or organization: Not on file     Attends meetings of clubs or organizations: Not on file     Relationship status: Not on file    Intimate partner violence     Fear of current or ex partner: Not on file     Emotionally abused: Not on file     Physically abused: Not on file     Forced sexual activity: Not on file   Other Topics Concern    Not on file   Social History Narrative    Not on file     No Known Allergies  Current Outpatient Medications on File Prior to Visit   Medication Sig Dispense Refill    polyethylene glycol (GLYCOLAX) 17 GM/SCOOP powder Take 17 g by mouth daily as needed (constipation) 510 g 0    ciprofloxacin (CIPRO) 500 MG tablet Take 1 tablet by mouth 2 times daily for 12 days 24 tablet 0    ZINC PO Take by mouth      Ascorbic Acid (OCTAVIO-C PO) Take by mouth      Omega-3 Fatty Acids (FISH OIL) 1000 MG CAPS Take 1,000 mg by mouth 3 times daily      Multiple Vitamins-Minerals (SUNVITE ACTIVE ADULT 50+ PO) Take by mouth      Coenzyme Q10 (COQ-10) 200 MG CAPS Take by mouth      aspirin 81 MG tablet Take 81 mg by mouth daily      Cholecalciferol (VITAMIN D3) 1000 UNITS TABS Take by mouth       No current facility-administered medications on file prior to visit. I have personally reviewed the ROS, PMH, PFH, and social history     Review of Systems   Constitutional: Negative for chills. HENT: Negative for congestion. Eyes: Negative for pain. Respiratory: Negative for shortness of breath. Cardiovascular: Negative for chest pain. Gastrointestinal: Negative for abdominal pain. Genitourinary: Positive for difficulty urinating, dysuria and urgency. Negative for hematuria. Musculoskeletal: Negative for back pain. This Encounter   Procedures    Lipid Panel     Standing Status:   Future     Standing Expiration Date:   10/21/2021     Order Specific Question:   Is Patient Fasting?/# of Hours     Answer:   8    TSH with Reflex     Standing Status:   Future     Standing Expiration Date:   10/21/2021    Urine Reflex to Culture     Standing Status:   Future     Standing Expiration Date:   10/21/2021     Order Specific Question:   SPECIFY(EX-CATH,MIDSTREAM,CYSTO,ETC)? Answer:   mid stream     Orders Placed This Encounter   Medications    famotidine (PEPCID) 40 MG tablet     Sig: Take 1 tablet by mouth nightly as needed (acid reflux)     Dispense:  30 tablet     Refill:  1   call in a week for flu shot 2020   If anything should change or worsen call ASAP, don't wait for next scheduled appointment. Return in about 2 weeks (around 11/4/2020) for Chronic condition management/appointment, worsening symptoms, call ASAP for appointment.       Nicolette Koch MD

## 2020-10-21 NOTE — TELEPHONE ENCOUNTER
Please relay this message to patient about the cipro  He should call asap if he's not better, if his burning, frequency etc of urination return, worsen, get new symptoms, etc.

## 2020-10-22 ENCOUNTER — TELEPHONE (OUTPATIENT)
Dept: FAMILY MEDICINE CLINIC | Age: 70
End: 2020-10-22

## 2020-10-22 ENCOUNTER — PATIENT MESSAGE (OUTPATIENT)
Dept: FAMILY MEDICINE CLINIC | Age: 70
End: 2020-10-22

## 2020-10-22 ENCOUNTER — HOSPITAL ENCOUNTER (EMERGENCY)
Age: 70
Discharge: HOME | End: 2020-10-22
Attending: EMERGENCY MEDICINE
Payer: MEDICAID

## 2020-10-22 ENCOUNTER — APPOINTMENT (OUTPATIENT)
Dept: GENERAL RADIOLOGY | Age: 70
End: 2020-10-22

## 2020-10-22 VITALS
HEIGHT: 78 IN | BODY MASS INDEX: 25.34 KG/M2 | TEMPERATURE: 98 F | OXYGEN SATURATION: 100 % | HEART RATE: 69 BPM | DIASTOLIC BLOOD PRESSURE: 74 MMHG | SYSTOLIC BLOOD PRESSURE: 115 MMHG | RESPIRATION RATE: 19 BRPM | WEIGHT: 219 LBS

## 2020-10-22 LAB
ALBUMIN SERPL-MCNC: 4.2 G/DL (ref 3.5–4.6)
ALP BLD-CCNC: 50 U/L (ref 35–104)
ALT SERPL-CCNC: 14 U/L (ref 0–41)
ANION GAP SERPL CALCULATED.3IONS-SCNC: 7 MEQ/L (ref 9–15)
AST SERPL-CCNC: 17 U/L (ref 0–40)
BASOPHILS ABSOLUTE: 0 K/UL (ref 0–0.2)
BASOPHILS RELATIVE PERCENT: 0.7 %
BILIRUB SERPL-MCNC: 0.4 MG/DL (ref 0.2–0.7)
BILIRUBIN URINE: NEGATIVE
BLOOD, URINE: NEGATIVE
BUN BLDV-MCNC: 13 MG/DL (ref 8–23)
CALCIUM SERPL-MCNC: 9.2 MG/DL (ref 8.5–9.9)
CHLORIDE BLD-SCNC: 104 MEQ/L (ref 95–107)
CHOLESTEROL, TOTAL: 166 MG/DL (ref 0–199)
CLARITY: CLEAR
CO2: 27 MEQ/L (ref 20–31)
COLOR: YELLOW
CREAT SERPL-MCNC: 0.93 MG/DL (ref 0.7–1.2)
EKG ATRIAL RATE: 72 BPM
EKG P AXIS: 33 DEGREES
EKG P-R INTERVAL: 168 MS
EKG Q-T INTERVAL: 398 MS
EKG QRS DURATION: 116 MS
EKG QTC CALCULATION (BAZETT): 435 MS
EKG R AXIS: -1 DEGREES
EKG T AXIS: 55 DEGREES
EKG VENTRICULAR RATE: 72 BPM
EOSINOPHILS ABSOLUTE: 0.1 K/UL (ref 0–0.7)
EOSINOPHILS RELATIVE PERCENT: 0.9 %
GFR AFRICAN AMERICAN: >60
GFR NON-AFRICAN AMERICAN: >60
GLOBULIN: 2.2 G/DL (ref 2.3–3.5)
GLUCOSE BLD-MCNC: 122 MG/DL (ref 70–99)
GLUCOSE URINE: NEGATIVE MG/DL
HCT VFR BLD CALC: 43 % (ref 42–52)
HDLC SERPL-MCNC: 76 MG/DL (ref 40–59)
HEMOGLOBIN: 14.2 G/DL (ref 14–18)
KETONES, URINE: NEGATIVE MG/DL
LDL CHOLESTEROL CALCULATED: 76 MG/DL (ref 0–129)
LEUKOCYTE ESTERASE, URINE: NEGATIVE
LIPASE: 27 U/L (ref 12–95)
LYMPHOCYTES ABSOLUTE: 1.4 K/UL (ref 1–4.8)
LYMPHOCYTES RELATIVE PERCENT: 24.3 %
MAGNESIUM: 2 MG/DL (ref 1.7–2.4)
MCH RBC QN AUTO: 31.2 PG (ref 27–31.3)
MCHC RBC AUTO-ENTMCNC: 33.1 % (ref 33–37)
MCV RBC AUTO: 94.4 FL (ref 80–100)
MONOCYTES ABSOLUTE: 0.4 K/UL (ref 0.2–0.8)
MONOCYTES RELATIVE PERCENT: 7.4 %
NEUTROPHILS ABSOLUTE: 3.9 K/UL (ref 1.4–6.5)
NEUTROPHILS RELATIVE PERCENT: 66.7 %
NITRITE, URINE: NEGATIVE
PDW BLD-RTO: 14.1 % (ref 11.5–14.5)
PH UA: 5.5 (ref 5–9)
PLATELET # BLD: 204 K/UL (ref 130–400)
POTASSIUM SERPL-SCNC: 4.1 MEQ/L (ref 3.4–4.9)
PROTEIN UA: NEGATIVE MG/DL
RBC # BLD: 4.55 M/UL (ref 4.7–6.1)
SODIUM BLD-SCNC: 138 MEQ/L (ref 135–144)
SPECIFIC GRAVITY UA: 1 (ref 1–1.03)
TOTAL PROTEIN: 6.4 G/DL (ref 6.3–8)
TRIGL SERPL-MCNC: 70 MG/DL (ref 0–150)
TROPONIN: <0.01 NG/ML (ref 0–0.01)
TSH SERPL DL<=0.05 MIU/L-ACNC: 1.24 UIU/ML (ref 0.44–3.86)
UROBILINOGEN, URINE: 0.2 E.U./DL
WBC # BLD: 5.8 K/UL (ref 4.8–10.8)

## 2020-10-22 PROCEDURE — 6360000002 HC RX W HCPCS: Performed by: EMERGENCY MEDICINE

## 2020-10-22 PROCEDURE — 99284 EMERGENCY DEPT VISIT MOD MDM: CPT

## 2020-10-22 PROCEDURE — 2580000003 HC RX 258: Performed by: EMERGENCY MEDICINE

## 2020-10-22 PROCEDURE — 93005 ELECTROCARDIOGRAM TRACING: CPT | Performed by: EMERGENCY MEDICINE

## 2020-10-22 PROCEDURE — 36415 COLL VENOUS BLD VENIPUNCTURE: CPT

## 2020-10-22 PROCEDURE — 83735 ASSAY OF MAGNESIUM: CPT

## 2020-10-22 PROCEDURE — 85025 COMPLETE CBC W/AUTO DIFF WBC: CPT

## 2020-10-22 PROCEDURE — 81003 URINALYSIS AUTO W/O SCOPE: CPT

## 2020-10-22 PROCEDURE — 83690 ASSAY OF LIPASE: CPT

## 2020-10-22 PROCEDURE — 74018 RADEX ABDOMEN 1 VIEW: CPT

## 2020-10-22 PROCEDURE — 6370000000 HC RX 637 (ALT 250 FOR IP): Performed by: EMERGENCY MEDICINE

## 2020-10-22 PROCEDURE — 80053 COMPREHEN METABOLIC PANEL: CPT

## 2020-10-22 PROCEDURE — 84443 ASSAY THYROID STIM HORMONE: CPT

## 2020-10-22 PROCEDURE — 80061 LIPID PANEL: CPT

## 2020-10-22 PROCEDURE — 84484 ASSAY OF TROPONIN QUANT: CPT

## 2020-10-22 RX ORDER — KETOROLAC TROMETHAMINE 15 MG/ML
15 INJECTION, SOLUTION INTRAMUSCULAR; INTRAVENOUS ONCE
Status: DISCONTINUED | OUTPATIENT
Start: 2020-10-22 | End: 2020-10-22 | Stop reason: HOSPADM

## 2020-10-22 RX ORDER — 0.9 % SODIUM CHLORIDE 0.9 %
1000 INTRAVENOUS SOLUTION INTRAVENOUS ONCE
Status: COMPLETED | OUTPATIENT
Start: 2020-10-22 | End: 2020-10-22

## 2020-10-22 RX ORDER — LORAZEPAM 1 MG/1
1 TABLET ORAL NIGHTLY PRN
Qty: 12 TABLET | Refills: 0 | Status: SHIPPED | OUTPATIENT
Start: 2020-10-22 | End: 2020-11-03

## 2020-10-22 RX ORDER — LACTULOSE 10 G/15ML
20 SOLUTION ORAL ONCE
Status: COMPLETED | OUTPATIENT
Start: 2020-10-22 | End: 2020-10-22

## 2020-10-22 RX ORDER — LACTULOSE 10 G/15ML
20 SOLUTION ORAL DAILY PRN
Qty: 1 BOTTLE | Refills: 0 | Status: SHIPPED | OUTPATIENT
Start: 2020-10-22 | End: 2020-11-23 | Stop reason: CLARIF

## 2020-10-22 RX ORDER — ACETAMINOPHEN 500 MG
1000 TABLET ORAL ONCE
Status: COMPLETED | OUTPATIENT
Start: 2020-10-22 | End: 2020-10-22

## 2020-10-22 RX ADMIN — SODIUM CHLORIDE 1000 ML: 9 INJECTION, SOLUTION INTRAVENOUS at 10:40

## 2020-10-22 RX ADMIN — LACTULOSE 20 G: 20 SOLUTION ORAL at 10:40

## 2020-10-22 RX ADMIN — ACETAMINOPHEN 1000 MG: 500 TABLET ORAL at 10:40

## 2020-10-22 RX ADMIN — MAGNESIUM CITRATE 296 ML: 1.75 LIQUID ORAL at 10:40

## 2020-10-22 ASSESSMENT — PAIN DESCRIPTION - ORIENTATION: ORIENTATION: LOWER

## 2020-10-22 ASSESSMENT — ENCOUNTER SYMPTOMS
BACK PAIN: 0
VOMITING: 0
NAUSEA: 0
SORE THROAT: 0
ABDOMINAL PAIN: 1
DIARRHEA: 0
CONSTIPATION: 1
COUGH: 0
SHORTNESS OF BREATH: 0

## 2020-10-22 ASSESSMENT — PAIN SCALES - GENERAL
PAINLEVEL_OUTOF10: 5
PAINLEVEL_OUTOF10: 6

## 2020-10-22 ASSESSMENT — PAIN DESCRIPTION - PAIN TYPE: TYPE: ACUTE PAIN

## 2020-10-22 ASSESSMENT — PAIN DESCRIPTION - FREQUENCY: FREQUENCY: CONTINUOUS

## 2020-10-22 ASSESSMENT — PAIN DESCRIPTION - LOCATION: LOCATION: ABDOMEN

## 2020-10-22 ASSESSMENT — PAIN DESCRIPTION - DESCRIPTORS: DESCRIPTORS: ACHING

## 2020-10-22 NOTE — TELEPHONE ENCOUNTER
Adan Espino returned call to office stating his wife is going to drive him to the ED now and wanted to let Dr Thomas Farooq know.

## 2020-10-22 NOTE — ED PROVIDER NOTES
[address] [facility] ED  eMERGENCYdEPARTMENT eNCOUnter      Pt Name: Luz Thomas  MRN: [id number]  [place]urt 1950  Date of evaluation: 10/22/2020  Ramez Padilla MD    CHIEF COMPLAINT           HPI  Luz Thomas is a 69 y.o. male per chart review has no pmh presents to the ED with weakness, constipation. Pt seen in the ED 4 days ago for lower ab pain. KUB negative. CT AP negative. Pt notes gradual onset, moderate, constant, throbbing, suprapubic ab pain x 4 days. +Constipation. Pt also notes gradual onset, moderate, constant, diffuse weakness since yesterday. Pt denies fever, cough, cp, sob, dysuria, diarrhea. ROS  Review of Systems   Constitutional: Negative for activity change, chills and fever. HENT: Negative for ear pain and sore throat. Eyes: Negative for visual disturbance. Respiratory: Negative for cough and shortness of breath. Cardiovascular: Negative for chest pain, palpitations and leg swelling. Gastrointestinal: Positive for abdominal pain and constipation. Negative for diarrhea, nausea and vomiting. Genitourinary: Negative for dysuria. Musculoskeletal: Negative for back pain. Skin: Negative for rash. Neurological: Positive for weakness. Negative for dizziness. Except as noted above the remainder of the review of systems was reviewed and negative. PAST MEDICAL HISTORY   History reviewed. No pertinent past medical history. SURGICAL HISTORY     History reviewed. No pertinent surgical history.       CURRENTMEDICATIONS       Previous Medications    ASCORBIC ACID (OCTAVIO-C PO)    Take by mouth    ASPIRIN 81 MG TABLET    Take 81 mg by mouth daily    CHOLECALCIFEROL (VITAMIN D3) 1000 UNITS TABS    Take by mouth    CIPROFLOXACIN (CIPRO) 500 MG TABLET    Take 1 tablet by mouth 2 times daily for 12 days    COENZYME Q10 (COQ-10) 200 MG CAPS    Take by mouth    FAMOTIDINE (PEPCID) 40 MG TABLET    Take 1 tablet by mouth nightly as needed (acid reflux) LACTULOSE (CHRONULAC) 10 GM/15ML SOLUTION    Take 30 mLs by mouth daily as needed (Prn constipation)    MAGNESIUM CITRATE SOLUTION    300 ml Once daily prn constipation    MULTIPLE VITAMINS-MINERALS (SUNVITE ACTIVE ADULT 50+ PO)    Take by mouth    OMEGA-3 FATTY ACIDS (FISH OIL) 1000 MG CAPS    Take 1,000 mg by mouth 3 times daily    POLYETHYLENE GLYCOL (GLYCOLAX) 17 GM/SCOOP POWDER    Take 17 g by mouth daily as needed (constipation)    ZINC PO    Take by mouth       ALLERGIES     Patient has no known allergies. FAMILY HISTORY     History reviewed. No pertinent family history.        SOCIAL HISTORY       Social History     Socioeconomic History    Marital status:      Spouse name: None    Number of children: None    Years of education: None    Highest education level: None   Occupational History    None   Social Needs    Financial resource strain: None    Food insecurity     Worry: Never true     Inability: Never true    Transportation needs     Medical: No     Non-medical: No   Tobacco Use    Smoking status: Former Smoker     Packs/day: 2.00     Years: 27.00     Pack years: 54.00     Start date:      Last attempt to quit: 1986     Years since quittin.8    Smokeless tobacco: Never Used    Tobacco comment: quit 32 years ago   Substance and Sexual Activity    Alcohol use: Yes     Comment: occasionally    Drug use: Never    Sexual activity: Yes     Partners: Female   Lifestyle    Physical activity     Days per week: None     Minutes per session: None    Stress: None   Relationships    Social connections     Talks on phone: None     Gets together: None     Attends Congregational service: None     Active member of club or organization: None     Attends meetings of clubs or organizations: None     Relationship status: None    Intimate partner violence     Fear of current or ex partner: None     Emotionally abused: None     Physically abused: None     Forced sexual activity: None Other Topics Concern    None   Social History Narrative    None         PHYSICAL EXAM       ED Triage Vitals [10/22/20 1008]   BP Temp Temp Source Pulse Resp SpO2 Height Weight   (!) 154/94 98 °F (36.7 °C) Temporal 80 18 99 % 6' 8\" (2.032 m) 219 lb (99.3 kg)       Physical Exam  Vitals signs and nursing note reviewed. Constitutional:       Appearance: He is well-developed. HENT:      Head: Normocephalic. Right Ear: External ear normal.      Left Ear: External ear normal.   Eyes:      Conjunctiva/sclera: Conjunctivae normal.      Pupils: Pupils are equal, round, and reactive to light. Neck:      Musculoskeletal: Normal range of motion and neck supple. Cardiovascular:      Rate and Rhythm: Normal rate and regular rhythm. Heart sounds: Normal heart sounds. Pulmonary:      Effort: Pulmonary effort is normal.      Breath sounds: Normal breath sounds. Abdominal:      Comments: Soft, nondistended. Moderate tenderness to palpation in suprapubic area. No rebound, guarding, peritoneal signs. Musculoskeletal: Normal range of motion. Skin:     General: Skin is warm and dry. Neurological:      Mental Status: He is alert and oriented to person, place, and time. Psychiatric:         Mood and Affect: Mood normal.           MDM  70 yo male presents to the ED with ab pain, weakness. Pt is afebrile, hemodynamically stable. Pt given 1 L NS, IV toradol, PO tylenol, PO Mg citrate, PO lactulose in the ED. KUB and CT AP reviewed which shows stool in sigmoid colon and close to rectal vault. EKG shows NSR with HR 72, normal axis, normal intervals, no ST changes. Labs remarkable for glucose 122. Ua negative. KUB negative. Pt reassessed and still feels about the same. Pt able to tolerate PO in the ED. Given no BM, pt given soap suds enema in the ED with resultant small liquid BM. Pt states he feels a little bit better. Pt states he has not been sleeping well due to the ab pain.   Insomnia likely cause of weakness. Pt without any sgins of infx. Pt given prescription for ativan for sleep. Pt given ab pain, weakness warning signs and will f/u with pcp. Pt understands plan. FINAL IMPRESSION      1. Constipation, unspecified constipation type    2. General weakness    3.  Insomnia, unspecified type          DISPOSITION/PLAN   DISPOSITION Decision To Discharge 10/22/2020 12:41:55 PM        DISCHARGE MEDICATIONS:  [unfilled]         Tali Nugent MD(electronically signed)  Attending Emergency Physician            Tali Nugent MD  10/22/20 3037

## 2020-10-22 NOTE — TELEPHONE ENCOUNTER
From: Kit Atkinson  To: Leticia Cruz MD  Sent: 10/22/2020 6:50 AM EDT  Subject: Non-Urgent Medical Question    The enima is not working what do they do at the hospital to remove the backed up feses not going to take any pain killers or be put to sleep can t sleep

## 2020-10-22 NOTE — ED NOTES
Pt assessed. Pt currently A+Ox4, neuro check completed and intact. PERRLA intact. Skin WPD  Lungs CTAB, resps even, unlabored. Pt denies cough  Heart tones regular, peripheral pulses present and equal.  No edema. Abdomen Soft tender, especially with movement. ND, BS + x 4 quadrants. Last BM 5-6 days ago per pt. Denies N/V/D. Reports enema completed yesterday, watery brown liquid returned. No results   WNL. Pt denies frequency, urgency, burning, or hesitation. EENT  WNL  WAGNER, gait steady  Pt resting in bed. No s/s distress noted. Visitor at bedside. Will continue to monitor.          Srinivasa Huerta RN  10/22/20 3210

## 2020-10-23 PROCEDURE — 93010 ELECTROCARDIOGRAM REPORT: CPT | Performed by: INTERNAL MEDICINE

## 2020-10-24 ENCOUNTER — TELEPHONE (OUTPATIENT)
Dept: FAMILY MEDICINE CLINIC | Age: 70
End: 2020-10-24

## 2020-10-24 ENCOUNTER — APPOINTMENT (OUTPATIENT)
Dept: GENERAL RADIOLOGY | Age: 70
End: 2020-10-24

## 2020-10-24 ENCOUNTER — HOSPITAL ENCOUNTER (EMERGENCY)
Age: 70
Discharge: HOME | End: 2020-10-24
Attending: STUDENT IN AN ORGANIZED HEALTH CARE EDUCATION/TRAINING PROGRAM

## 2020-10-24 VITALS
OXYGEN SATURATION: 97 % | RESPIRATION RATE: 18 BRPM | SYSTOLIC BLOOD PRESSURE: 131 MMHG | DIASTOLIC BLOOD PRESSURE: 89 MMHG | TEMPERATURE: 98 F | WEIGHT: 219 LBS | HEIGHT: 78 IN | BODY MASS INDEX: 25.34 KG/M2 | HEART RATE: 71 BPM

## 2020-10-24 DIAGNOSIS — Z00.00 ANNUAL PHYSICAL EXAM: ICD-10-CM

## 2020-10-24 DIAGNOSIS — N41.0 ACUTE PROSTATITIS: ICD-10-CM

## 2020-10-24 LAB
ALBUMIN SERPL-MCNC: 4.3 G/DL (ref 3.5–4.6)
ALP BLD-CCNC: 49 U/L (ref 35–104)
ALT SERPL-CCNC: 20 U/L (ref 0–41)
ANION GAP SERPL CALCULATED.3IONS-SCNC: 11 MEQ/L (ref 9–15)
APTT: 24.1 SEC (ref 24.4–36.8)
AST SERPL-CCNC: 20 U/L (ref 0–40)
BACTERIA: NEGATIVE /HPF
BASOPHILS ABSOLUTE: 0.1 K/UL (ref 0–0.2)
BASOPHILS RELATIVE PERCENT: 0.8 %
BILIRUB SERPL-MCNC: 0.3 MG/DL (ref 0.2–0.7)
BILIRUBIN URINE: NEGATIVE
BILIRUBIN URINE: NEGATIVE
BLOOD, URINE: NEGATIVE
BLOOD, URINE: NEGATIVE
BUN BLDV-MCNC: 15 MG/DL (ref 8–23)
C-REACTIVE PROTEIN: <0.3 MG/L (ref 0–5)
CALCIUM SERPL-MCNC: 10.1 MG/DL (ref 8.5–9.9)
CHLORIDE BLD-SCNC: 103 MEQ/L (ref 95–107)
CHOLESTEROL, TOTAL: 177 MG/DL (ref 0–199)
CLARITY: CLEAR
CLARITY: CLEAR
CO2: 24 MEQ/L (ref 20–31)
COLOR: YELLOW
COLOR: YELLOW
CREAT SERPL-MCNC: 0.78 MG/DL (ref 0.7–1.2)
EOSINOPHILS ABSOLUTE: 0.1 K/UL (ref 0–0.7)
EOSINOPHILS RELATIVE PERCENT: 0.8 %
EPITHELIAL CELLS, UA: NORMAL /HPF (ref 0–5)
GFR AFRICAN AMERICAN: >60
GFR NON-AFRICAN AMERICAN: >60
GLOBULIN: 2.4 G/DL (ref 2.3–3.5)
GLUCOSE BLD-MCNC: 103 MG/DL (ref 70–99)
GLUCOSE URINE: NEGATIVE MG/DL
GLUCOSE URINE: NEGATIVE MG/DL
HCT VFR BLD CALC: 42.8 % (ref 42–52)
HDLC SERPL-MCNC: 80 MG/DL (ref 40–59)
HEMOGLOBIN: 14.6 G/DL (ref 14–18)
HYALINE CASTS: NORMAL /HPF (ref 0–5)
INFLUENZA A BY PCR: NEGATIVE
INFLUENZA B BY PCR: NEGATIVE
INR BLD: 1
KETONES, URINE: NEGATIVE MG/DL
KETONES, URINE: NEGATIVE MG/DL
LACTIC ACID: 1.4 MMOL/L (ref 0.5–2.2)
LDL CHOLESTEROL CALCULATED: 84 MG/DL (ref 0–129)
LEUKOCYTE ESTERASE, URINE: ABNORMAL
LEUKOCYTE ESTERASE, URINE: NEGATIVE
LIPASE: 33 U/L (ref 12–95)
LYMPHOCYTES ABSOLUTE: 1.9 K/UL (ref 1–4.8)
LYMPHOCYTES RELATIVE PERCENT: 24.2 %
MCH RBC QN AUTO: 31.9 PG (ref 27–31.3)
MCHC RBC AUTO-ENTMCNC: 34.1 % (ref 33–37)
MCV RBC AUTO: 93.4 FL (ref 80–100)
MONOCYTES ABSOLUTE: 0.5 K/UL (ref 0.2–0.8)
MONOCYTES RELATIVE PERCENT: 7.1 %
NEUTROPHILS ABSOLUTE: 5.1 K/UL (ref 1.4–6.5)
NEUTROPHILS RELATIVE PERCENT: 67.1 %
NITRITE, URINE: NEGATIVE
NITRITE, URINE: NEGATIVE
PDW BLD-RTO: 13.6 % (ref 11.5–14.5)
PH UA: 5 (ref 5–9)
PH UA: 5.5 (ref 5–9)
PLATELET # BLD: 216 K/UL (ref 130–400)
POTASSIUM SERPL-SCNC: 3.8 MEQ/L (ref 3.4–4.9)
PROCALCITONIN: <0.02 NG/ML (ref 0–0.15)
PROTEIN UA: NEGATIVE MG/DL
PROTEIN UA: NEGATIVE MG/DL
PROTHROMBIN TIME: 13.3 SEC (ref 12.3–14.9)
RBC # BLD: 4.58 M/UL (ref 4.7–6.1)
RBC UA: NORMAL /HPF (ref 0–5)
SODIUM BLD-SCNC: 138 MEQ/L (ref 135–144)
SPECIFIC GRAVITY UA: 1.01 (ref 1–1.03)
SPECIFIC GRAVITY UA: 1.02 (ref 1–1.03)
TOTAL CK: 98 U/L (ref 0–190)
TOTAL PROTEIN: 6.7 G/DL (ref 6.3–8)
TRIGL SERPL-MCNC: 67 MG/DL (ref 0–150)
TSH REFLEX: 1.41 UIU/ML (ref 0.44–3.86)
URINE REFLEX TO CULTURE: ABNORMAL
URINE REFLEX TO CULTURE: NORMAL
UROBILINOGEN, URINE: 0.2 E.U./DL
UROBILINOGEN, URINE: 0.2 E.U./DL
WBC # BLD: 7.7 K/UL (ref 4.8–10.8)
WBC UA: NORMAL /HPF (ref 0–5)

## 2020-10-24 PROCEDURE — 80053 COMPREHEN METABOLIC PANEL: CPT

## 2020-10-24 PROCEDURE — 85730 THROMBOPLASTIN TIME PARTIAL: CPT

## 2020-10-24 PROCEDURE — 84145 PROCALCITONIN (PCT): CPT

## 2020-10-24 PROCEDURE — 85025 COMPLETE CBC W/AUTO DIFF WBC: CPT

## 2020-10-24 PROCEDURE — 83605 ASSAY OF LACTIC ACID: CPT

## 2020-10-24 PROCEDURE — 96374 THER/PROPH/DIAG INJ IV PUSH: CPT

## 2020-10-24 PROCEDURE — 83690 ASSAY OF LIPASE: CPT

## 2020-10-24 PROCEDURE — 99283 EMERGENCY DEPT VISIT LOW MDM: CPT

## 2020-10-24 PROCEDURE — 36415 COLL VENOUS BLD VENIPUNCTURE: CPT

## 2020-10-24 PROCEDURE — 86140 C-REACTIVE PROTEIN: CPT

## 2020-10-24 PROCEDURE — 87502 INFLUENZA DNA AMP PROBE: CPT

## 2020-10-24 PROCEDURE — 82550 ASSAY OF CK (CPK): CPT

## 2020-10-24 PROCEDURE — 6360000002 HC RX W HCPCS: Performed by: STUDENT IN AN ORGANIZED HEALTH CARE EDUCATION/TRAINING PROGRAM

## 2020-10-24 PROCEDURE — 87040 BLOOD CULTURE FOR BACTERIA: CPT

## 2020-10-24 PROCEDURE — 71045 X-RAY EXAM CHEST 1 VIEW: CPT

## 2020-10-24 PROCEDURE — 85610 PROTHROMBIN TIME: CPT

## 2020-10-24 PROCEDURE — 81001 URINALYSIS AUTO W/SCOPE: CPT

## 2020-10-24 RX ORDER — PHENAZOPYRIDINE HYDROCHLORIDE 200 MG/1
200 TABLET, FILM COATED ORAL 3 TIMES DAILY PRN
Qty: 6 TABLET | Refills: 0 | Status: SHIPPED | OUTPATIENT
Start: 2020-10-24 | End: 2020-10-27

## 2020-10-24 RX ORDER — CIPROFLOXACIN 500 MG/1
500 TABLET, FILM COATED ORAL 2 TIMES DAILY
Qty: 14 TABLET | Refills: 0 | Status: SHIPPED | OUTPATIENT
Start: 2020-10-24 | End: 2020-10-29 | Stop reason: SINTOL

## 2020-10-24 RX ORDER — ZOLPIDEM TARTRATE 5 MG/1
5 TABLET ORAL NIGHTLY PRN
Qty: 7 TABLET | Refills: 0 | Status: SHIPPED | OUTPATIENT
Start: 2020-10-24 | End: 2020-10-31

## 2020-10-24 RX ORDER — KETOROLAC TROMETHAMINE 15 MG/ML
15 INJECTION, SOLUTION INTRAMUSCULAR; INTRAVENOUS
Status: COMPLETED | OUTPATIENT
Start: 2020-10-24 | End: 2020-10-24

## 2020-10-24 RX ADMIN — KETOROLAC TROMETHAMINE 15 MG: 15 INJECTION, SOLUTION INTRAMUSCULAR; INTRAVENOUS at 15:10

## 2020-10-24 ASSESSMENT — ENCOUNTER SYMPTOMS
ANAL BLEEDING: 0
CHEST TIGHTNESS: 0
BLOOD IN STOOL: 0
COUGH: 0
SHORTNESS OF BREATH: 0
VOMITING: 0
DIARRHEA: 0
SINUS PRESSURE: 0
ABDOMINAL PAIN: 0
TROUBLE SWALLOWING: 0
BACK PAIN: 0

## 2020-10-24 ASSESSMENT — PAIN SCALES - GENERAL: PAINLEVEL_OUTOF10: 6

## 2020-10-24 NOTE — TELEPHONE ENCOUNTER
Paged several times  See [facility] page log  ultimtaely direct back to er again today  Needs labs (cbc, crp, blood cultures, bladder and prostate US, possible repeat CT, continue abx, etc.)  Er informed  Patient in er  Spoke with him multiple times today   Documented now.

## 2020-10-24 NOTE — ED NOTES
Dr Indiana Weldon and Nai Sender RN in for rectal exam. Heme negative.       Margarita Gamino, RN  10/24/20 6162

## 2020-10-24 NOTE — ED PROVIDER NOTES
[address] [facility] ED  eMERGENCY dEPARTMENT eNCOUnter      Pt Name: Michael Long  MRN: [id number]  [place] 1950  Date of evaluation: 10/24/2020  Provider: Kiana Colbert DO    CHIEF COMPLAINT       Chief Complaint   Patient presents with    Other     possible abscess from prostate issue          HISTORY OF PRESENT ILLNESS   (Location/Symptom, Timing/Onset,Context/Setting, Quality, Duration, Modifying Factors, Severity)  Note limiting factors. Michael Long is a 69 y.o. male who presents to the emergency department with complaint of prostate pain. Patient had recently had a CAT scan that showed diverticuli but no diverticulitis. Patient is worried that the infection may have gone into his bloodstream.  Patient states since taking Cipro he does not have a taste for food and his wife is having him drink some protein drinks like Ensure. Patient also states he is more irritable and snapping at his pets and his family since starting to take Cipro. Patient denies any fever. He felt like he might have chills. Patient denies vomiting or diarrhea. Patient states he has pain when he sits feels like it is inside. Infectious disease doc wanted the patient to come to the ER have testing done and included in that testing she wanted the patient to have a prostatic massage then provide a urine sample to assess whether or not there is an infection in the urine itself. Patient stating that he is taken about a week's worth of Cipro and he only has a 12-day supply. His daughter who works in the health field had had a kidney infection had to be treated for several weeks with Cipro. The patient is concerned that he may not have enough of the prescription to eradicate the infection. Patient states he took an enema last night to relieve constipation. The patient's wife is present. I have the patient's permission to interview, examined him, and discussed his care with his wife present.     The history is provided by the patient and the spouse. NursingNotes were reviewed. REVIEW OF SYSTEMS    (2-9 systems for level 4, 10 or more for level 5)     Review of Systems   Constitutional: Negative for activity change, appetite change, chills, fever and unexpected weight change. HENT: Negative for drooling, ear pain, nosebleeds, sinus pressure and trouble swallowing. Respiratory: Negative for cough, chest tightness and shortness of breath. Cardiovascular: Negative for chest pain and leg swelling. Gastrointestinal: Negative for abdominal pain, anal bleeding, blood in stool, diarrhea and vomiting. Anal pain   Endocrine: Negative for polydipsia and polyphagia. Genitourinary: Positive for difficulty urinating. Negative for discharge, dysuria, flank pain, frequency, hematuria, penile pain, penile swelling, scrotal swelling and testicular pain. Musculoskeletal: Negative for back pain and myalgias. Skin: Negative for pallor and rash. Neurological: Negative for syncope, weakness and headaches. Hematological: Does not bruise/bleed easily. All other systems reviewed and are negative. Except as noted above the remainder of the review of systems was reviewed and negative. PAST MEDICAL HISTORY   History reviewed. No pertinent past medical history. SURGICALHISTORY     History reviewed. No pertinent surgical history. CURRENT MEDICATIONS       Discharge Medication List as of 10/24/2020  4:52 PM      CONTINUE these medications which have NOT CHANGED    Details   lactulose (CHRONULAC) 10 GM/15ML solution Take 30 mLs by mouth daily as needed (Prn constipation), Disp-1 Bottle,R-0Normal      magnesium citrate solution 300 ml Once daily prn constipation, Disp-1500 mL,R-0Normal      LORazepam (ATIVAN) 1 MG tablet Take 1 tablet by mouth nightly as needed (Insomnia) for up to 12 days. , Disp-12 tablet,R-0Print      famotidine (PEPCID) 40 MG tablet Take 1 tablet by mouth nightly as needed (acid reflux), Disp-30 tablet,R-1Normal      ZINC PO Take by mouthHistorical Med      Ascorbic Acid (OCTAVIO-C PO) Take by mouthHistorical Med      polyethylene glycol (GLYCOLAX) 17 GM/SCOOP powder Take 17 g by mouth daily as needed (constipation), Disp-510 g,R-0Print      Omega-3 Fatty Acids (FISH OIL) 1000 MG CAPS Take 1,000 mg by mouth 3 times daily      Multiple Vitamins-Minerals (SUNVITE ACTIVE ADULT 50+ PO) Take by mouth      Coenzyme Q10 (COQ-10) 200 MG CAPS Take by mouth      aspirin 81 MG tablet Take 81 mg by mouth daily      Cholecalciferol (VITAMIN D3) 1000 UNITS TABS Take by mouth             ALLERGIES     Patient has no known allergies. FAMILY HISTORY     History reviewed. No pertinent family history.        SOCIAL HISTORY       Social History     Socioeconomic History    Marital status:      Spouse name: None    Number of children: None    Years of education: None    Highest education level: None   Occupational History    None   Social Needs    Financial resource strain: None    Food insecurity     Worry: Never true     Inability: Never true    Transportation needs     Medical: No     Non-medical: No   Tobacco Use    Smoking status: Former Smoker     Packs/day: 2.00     Years: 27.00     Pack years: 54.00     Start date:      Last attempt to quit: 1986     Years since quittin.8    Smokeless tobacco: Never Used    Tobacco comment: quit 32 years ago   Substance and Sexual Activity    Alcohol use: Yes     Comment: occasionally    Drug use: Never    Sexual activity: Yes     Partners: Female   Lifestyle    Physical activity     Days per week: None     Minutes per session: None    Stress: None   Relationships    Social connections     Talks on phone: None     Gets together: None     Attends [employer] service: None     Active member of club or organization: None     Attends meetings of clubs or organizations: None     Relationship status: None    Intimate partner violence     Fear of current or ex partner: None     Emotionally abused: None     Physically abused: None     Forced sexual activity: None   Other Topics Concern    None   Social History Narrative    None       SCREENINGS      @FLOW([id number])@      PHYSICAL EXAM    (up to 7 for level 4, 8 or more for level 5)     ED Triage Vitals [10/24/20 1341]   BP Temp Temp Source Pulse Resp SpO2 Height Weight   135/87 98 °F (36.7 °C) Oral 82 18 97 % 6' 8\" (2.032 m) 219 lb (99.3 kg)       Physical Exam  Vitals signs and nursing note reviewed. Exam conducted with a chaperone present. Constitutional:       General: He is awake. He is not in acute distress. Appearance: Normal appearance. He is well-developed and normal weight. He is not ill-appearing, toxic-appearing or diaphoretic. Comments: No photophobia. No phonophobia. HENT:      Head: Normocephalic and atraumatic. No Leger's sign. Right Ear: External ear normal.      Left Ear: External ear normal.      Nose: Nose normal. No congestion or rhinorrhea. Mouth/Throat:      Mouth: Mucous membranes are moist.      Pharynx: Oropharynx is clear. No oropharyngeal exudate or posterior oropharyngeal erythema. Eyes:      General: No scleral icterus. Right eye: No foreign body or discharge. Left eye: No discharge. Extraocular Movements: Extraocular movements intact. Conjunctiva/sclera: Conjunctivae normal.      Left eye: No exudate. Pupils: Pupils are equal, round, and reactive to light. Neck:      Musculoskeletal: Normal range of motion and neck supple. No neck rigidity. Vascular: No JVD. Trachea: No tracheal deviation. Comments: No meningismus. Cardiovascular:      Rate and Rhythm: Normal rate and regular rhythm. Pulses: Normal pulses. Heart sounds: Normal heart sounds. Heart sounds not distant. No murmur. No friction rub. No gallop.     Pulmonary:      Effort: Pulmonary effort is normal. No respiratory distress. Breath sounds: Normal breath sounds. No stridor. No wheezing, rhonchi or rales. Chest:      Chest wall: No tenderness. Abdominal:      General: Abdomen is flat. Bowel sounds are normal. There is no distension. Palpations: Abdomen is soft. There is no mass. Tenderness: There is no abdominal tenderness. There is no right CVA tenderness, left CVA tenderness, guarding or rebound. Hernia: No hernia is present. Genitourinary:     Penis: Circumcised. Scrotum/Testes: Normal.      Prostate: Tender. Not enlarged and no nodules present. Rectum: Normal. Guaiac result negative. No mass, anal fissure, external hemorrhoid or internal hemorrhoid. Musculoskeletal: Normal range of motion. General: No swelling, tenderness, deformity or signs of injury. Lymphadenopathy:      Head:      Right side of head: No submental adenopathy. Left side of head: No submental adenopathy. Skin:     General: Skin is warm and dry. Capillary Refill: Capillary refill takes less than 2 seconds. Coloration: Skin is not jaundiced or pale. Findings: No bruising, erythema, lesion or rash. Neurological:      General: No focal deficit present. Mental Status: He is alert and oriented to person, place, and time. Mental status is at baseline. Cranial Nerves: No cranial nerve deficit. Sensory: No sensory deficit. Motor: No weakness. Coordination: Coordination normal.      Deep Tendon Reflexes: Reflexes are normal and symmetric. Psychiatric:         Mood and Affect: Mood normal.         Behavior: Behavior normal. Behavior is cooperative. Thought Content:  Thought content normal.         Judgment: Judgment normal.         DIAGNOSTIC RESULTS     EKG: All EKG's are interpreted by the Emergency Department Physician who either signs or Co-signsthis chart in the absence of a cardiologist.        RADIOLOGY:   Non-plain filmimages such as CT, Ultrasound and MRI are read by the radiologist. Plain radiographic images are visualized and preliminarily interpreted by the emergency physician with the below findings:        Interpretation per the Radiologist below, if available at the time ofthis note:    XR CHEST PORTABLE   Final Result      NO EVIDENCE OF ACTIVE CARDIOPULMONARY DISEASE. ED BEDSIDE ULTRASOUND:   Performed by ED Physician - none    LABS:  Labs Reviewed   CBC WITH AUTO DIFFERENTIAL - Abnormal; Notable for the following components:       Result Value    RBC 4.58 (*)     MCH 31.9 (*)     All other components within normal limits   COMPREHENSIVE METABOLIC PANEL - Abnormal; Notable for the following components:    Glucose 103 (*)     Calcium 10.1 (*)     All other components within normal limits   URINE RT REFLEX TO CULTURE - Abnormal; Notable for the following components:    Leukocyte Esterase, Urine TRACE (*)     All other components within normal limits   APTT - Abnormal; Notable for the following components:    aPTT 24.1 (*)     All other components within normal limits   RAPID INFLUENZA A/B ANTIGENS   CULTURE, BLOOD 1   CULTURE, BLOOD 2   PROCALCITONIN   CK   LACTIC ACID, PLASMA   LIPASE   C-REACTIVE PROTEIN   PROTIME-INR   MICROSCOPIC URINALYSIS       All other labs were within normal range or not returned as of this dictation. EMERGENCY DEPARTMENT COURSE and DIFFERENTIAL DIAGNOSIS/MDM:   Vitals:    Vitals:    10/24/20 1341 10/24/20 1540 10/24/20 1635   BP: 135/87 (!) 128/97 131/89   Pulse: 82 73 71   Resp: 18 17 18   Temp: 98 °F (36.7 °C)     TempSrc: Oral     SpO2: 97% 99% 97%   Weight: 219 lb (99.3 kg)     Height: 6' 8\" (2.032 m)             MDM    ED attending spoke with Dr. Shanell Valdez 2 times during the patient's stay. CRP is less than 0.3. Procalcitonin is negative. Lactate is within normal limits.   The patient when he had the prostate exam states that it was not nearly as painful as his prior week earlier prostate exam.  I believe the patient is improving on Cipro. I have given him a prescription for an additional 1 week after discussing is concerned that the antibiotic is being terminated too early. ER physician did explain the patient that Cipro can cause tendinitis and tendon injuries like Achilles tendon rupture. He is also avoid sun exposure or UV light exposure from tanning beds because it can cause a photodermatitis and severe burn. Patient states that he does not plan on being exposed to UV light and he actually avoids the sun. On reexamination the findings were discussed with the patient and his wife in detail. The ER physician spent 10 minutes discussing the plan and follow-up. Patient states after he is finished his antibiotics 2 days later he is to get a PSA done. The patient is wife verbalized understanding of the care and of no further questions. CONSULTS:  None    PROCEDURES:  Unless otherwise noted below, none     Procedures    FINAL IMPRESSION      1. Prostatitis, unspecified prostatitis type    2. Acute cystitis without hematuria          DISPOSITION/PLAN   DISPOSITION Decision To Discharge 10/24/2020 04:23:52 PM      PATIENT REFERRED TO:  Radha Benjamin MD  [id number] [address] [phone]    Call in 2 days  If symptoms worsen    Hawk Ochoa MD  [address]  [phone]      Follow-up to get the labs 2 days after last dose of Cipro. DISCHARGE MEDICATIONS:  Discharge Medication List as of 10/24/2020  4:52 PM      START taking these medications    Details   phenazopyridine (PYRIDIUM) 200 MG tablet Take 1 tablet by mouth 3 times daily as needed for Pain (bladder spasm/pain), Disp-6 tablet,R-0Print      zolpidem (AMBIEN) 5 MG tablet Take 1 tablet by mouth nightly as needed for Sleep for up to 7 days. , Disp-7 tablet,R-0Print                (Please note that portions of this note were completed with a voice recognition program.  Efforts were made to edit the dictations but occasionally words are mis-transcribed.)    Reena Sanchez DO (electronically signed)  Attending Emergency Physician          Reena Sanchez DO  10/24/20 [id number]

## 2020-10-26 ENCOUNTER — TELEPHONE (OUTPATIENT)
Dept: FAMILY MEDICINE CLINIC | Age: 70
End: 2020-10-26

## 2020-10-26 NOTE — TELEPHONE ENCOUNTER
Patient still not feeling well  Paged me about 545 am  Mentions sitting in a hot bath makes him feel better  Explained he is urinating every 15 minutes and feels like he has to have a BM. Worsening chills  Spoke to Dr Chirag Wills over weekend unable to get bladder/prostate US in ER  Would be done as an outpatient this week      Should we switch to bactrim? (se from cipro?)  He would like to speak to you  Do you want prostate/bladder US?

## 2020-10-27 ENCOUNTER — HOSPITAL ENCOUNTER (EMERGENCY)
Age: 70
Discharge: HOME | End: 2020-10-27
Attending: STUDENT IN AN ORGANIZED HEALTH CARE EDUCATION/TRAINING PROGRAM

## 2020-10-27 ENCOUNTER — APPOINTMENT (OUTPATIENT)
Dept: CT IMAGING | Age: 70
End: 2020-10-27

## 2020-10-27 ENCOUNTER — APPOINTMENT (OUTPATIENT)
Dept: GENERAL RADIOLOGY | Age: 70
End: 2020-10-27

## 2020-10-27 VITALS
DIASTOLIC BLOOD PRESSURE: 68 MMHG | SYSTOLIC BLOOD PRESSURE: 138 MMHG | TEMPERATURE: 97.8 F | HEIGHT: 78 IN | RESPIRATION RATE: 18 BRPM | WEIGHT: 218 LBS | HEART RATE: 63 BPM | BODY MASS INDEX: 25.22 KG/M2 | OXYGEN SATURATION: 99 %

## 2020-10-27 LAB
ALBUMIN SERPL-MCNC: 4.3 G/DL (ref 3.5–4.6)
ALP BLD-CCNC: 53 U/L (ref 35–104)
ALT SERPL-CCNC: 19 U/L (ref 0–41)
ANION GAP SERPL CALCULATED.3IONS-SCNC: 13 MEQ/L (ref 9–15)
APTT: 26.7 SEC (ref 24.4–36.8)
AST SERPL-CCNC: 19 U/L (ref 0–40)
BASOPHILS ABSOLUTE: 0.1 K/UL (ref 0–0.2)
BASOPHILS RELATIVE PERCENT: 0.8 %
BILIRUB SERPL-MCNC: 0.4 MG/DL (ref 0.2–0.7)
BUN BLDV-MCNC: 16 MG/DL (ref 8–23)
C-REACTIVE PROTEIN, HIGH SENSITIVITY: 0.5 MG/L (ref 0–5)
CALCIUM SERPL-MCNC: 9.9 MG/DL (ref 8.5–9.9)
CHLORIDE BLD-SCNC: 98 MEQ/L (ref 95–107)
CO2: 25 MEQ/L (ref 20–31)
CREAT SERPL-MCNC: 0.9 MG/DL (ref 0.7–1.2)
D DIMER: 0.28 MG/L FEU (ref 0–0.5)
EOSINOPHILS ABSOLUTE: 0.1 K/UL (ref 0–0.7)
EOSINOPHILS RELATIVE PERCENT: 1 %
GFR AFRICAN AMERICAN: >60
GFR NON-AFRICAN AMERICAN: >60
GLOBULIN: 2.8 G/DL (ref 2.3–3.5)
GLUCOSE BLD-MCNC: 105 MG/DL (ref 70–99)
HCT VFR BLD CALC: 46.6 % (ref 42–52)
HEMOGLOBIN: 15.7 G/DL (ref 14–18)
INFLUENZA A BY PCR: NEGATIVE
INFLUENZA B BY PCR: NEGATIVE
INR BLD: 1
LACTIC ACID: 1.7 MMOL/L (ref 0.5–2.2)
LYMPHOCYTES ABSOLUTE: 1.4 K/UL (ref 1–4.8)
LYMPHOCYTES RELATIVE PERCENT: 20.9 %
MCH RBC QN AUTO: 32 PG (ref 27–31.3)
MCHC RBC AUTO-ENTMCNC: 33.7 % (ref 33–37)
MCV RBC AUTO: 95 FL (ref 80–100)
MONO TEST: NEGATIVE
MONOCYTES ABSOLUTE: 0.5 K/UL (ref 0.2–0.8)
MONOCYTES RELATIVE PERCENT: 7.3 %
NEUTROPHILS ABSOLUTE: 4.5 K/UL (ref 1.4–6.5)
NEUTROPHILS RELATIVE PERCENT: 70 %
PARATHYROID HORMONE INTACT: 53.1 PG/ML (ref 15–65)
PDW BLD-RTO: 14.1 % (ref 11.5–14.5)
PLATELET # BLD: 210 K/UL (ref 130–400)
POTASSIUM SERPL-SCNC: 4.3 MEQ/L (ref 3.4–4.9)
PROCALCITONIN: <0.02 NG/ML (ref 0–0.15)
PROTHROMBIN TIME: 13 SEC (ref 12.3–14.9)
RBC # BLD: 4.91 M/UL (ref 4.7–6.1)
RPR: NORMAL
SARS-COV-2, NAAT: NOT DETECTED
SEDIMENTATION RATE, ERYTHROCYTE: 6 MM (ref 0–20)
SODIUM BLD-SCNC: 136 MEQ/L (ref 135–144)
STREP GRP A PCR: NEGATIVE
TOTAL PROTEIN: 7.1 G/DL (ref 6.3–8)
TSH SERPL DL<=0.05 MIU/L-ACNC: 1 UIU/ML (ref 0.44–3.86)
WBC # BLD: 6.5 K/UL (ref 4.8–10.8)

## 2020-10-27 PROCEDURE — 86592 SYPHILIS TEST NON-TREP QUAL: CPT

## 2020-10-27 PROCEDURE — 86141 C-REACTIVE PROTEIN HS: CPT

## 2020-10-27 PROCEDURE — 84145 PROCALCITONIN (PCT): CPT

## 2020-10-27 PROCEDURE — 85730 THROMBOPLASTIN TIME PARTIAL: CPT

## 2020-10-27 PROCEDURE — 87502 INFLUENZA DNA AMP PROBE: CPT

## 2020-10-27 PROCEDURE — 85025 COMPLETE CBC W/AUTO DIFF WBC: CPT

## 2020-10-27 PROCEDURE — 96361 HYDRATE IV INFUSION ADD-ON: CPT

## 2020-10-27 PROCEDURE — 83605 ASSAY OF LACTIC ACID: CPT

## 2020-10-27 PROCEDURE — 87651 STREP A DNA AMP PROBE: CPT

## 2020-10-27 PROCEDURE — 80053 COMPREHEN METABOLIC PANEL: CPT

## 2020-10-27 PROCEDURE — 85610 PROTHROMBIN TIME: CPT

## 2020-10-27 PROCEDURE — 96360 HYDRATION IV INFUSION INIT: CPT

## 2020-10-27 PROCEDURE — 71046 X-RAY EXAM CHEST 2 VIEWS: CPT

## 2020-10-27 PROCEDURE — 36415 COLL VENOUS BLD VENIPUNCTURE: CPT

## 2020-10-27 PROCEDURE — 70450 CT HEAD/BRAIN W/O DYE: CPT

## 2020-10-27 PROCEDURE — 86308 HETEROPHILE ANTIBODY SCREEN: CPT

## 2020-10-27 PROCEDURE — 85652 RBC SED RATE AUTOMATED: CPT

## 2020-10-27 PROCEDURE — 87040 BLOOD CULTURE FOR BACTERIA: CPT

## 2020-10-27 PROCEDURE — U0002 COVID-19 LAB TEST NON-CDC: HCPCS

## 2020-10-27 PROCEDURE — 84443 ASSAY THYROID STIM HORMONE: CPT

## 2020-10-27 PROCEDURE — 85379 FIBRIN DEGRADATION QUANT: CPT

## 2020-10-27 PROCEDURE — 99283 EMERGENCY DEPT VISIT LOW MDM: CPT

## 2020-10-27 PROCEDURE — 83970 ASSAY OF PARATHORMONE: CPT

## 2020-10-27 PROCEDURE — 2580000003 HC RX 258: Performed by: STUDENT IN AN ORGANIZED HEALTH CARE EDUCATION/TRAINING PROGRAM

## 2020-10-27 RX ORDER — 0.9 % SODIUM CHLORIDE 0.9 %
1000 INTRAVENOUS SOLUTION INTRAVENOUS ONCE
Status: COMPLETED | OUTPATIENT
Start: 2020-10-27 | End: 2020-10-27

## 2020-10-27 RX ORDER — 0.9 % SODIUM CHLORIDE 0.9 %
500 INTRAVENOUS SOLUTION INTRAVENOUS ONCE
Status: COMPLETED | OUTPATIENT
Start: 2020-10-27 | End: 2020-10-27

## 2020-10-27 RX ADMIN — SODIUM CHLORIDE 1000 ML: 9 INJECTION, SOLUTION INTRAVENOUS at 09:43

## 2020-10-27 RX ADMIN — SODIUM CHLORIDE 500 ML: 9 INJECTION, SOLUTION INTRAVENOUS at 08:32

## 2020-10-27 ASSESSMENT — ENCOUNTER SYMPTOMS
SHORTNESS OF BREATH: 0
SINUS PRESSURE: 0
DIARRHEA: 0
ABDOMINAL PAIN: 0
VOMITING: 0
CHEST TIGHTNESS: 0
NAUSEA: 0
BACK PAIN: 0
COUGH: 0
TROUBLE SWALLOWING: 0

## 2020-10-27 NOTE — ED NOTES
covid swab obtained and double bagged, sent to lab, flu swab obtained and sent to lab in double bag, strep swab obtained and sent in double bagged     Bonnie Chi RN  10/27/20 5608

## 2020-10-27 NOTE — ED PROVIDER NOTES
trouble swallowing. Respiratory: Negative for cough, chest tightness and shortness of breath. Cardiovascular: Negative for chest pain and leg swelling. Gastrointestinal: Negative for abdominal pain, diarrhea, nausea and vomiting. Endocrine: Negative for polydipsia and polyphagia. Genitourinary: Negative for dysuria, flank pain and frequency. Musculoskeletal: Negative for back pain and myalgias. Skin: Negative for pallor and rash. Neurological: Negative for syncope, weakness and headaches. Hematological: Does not bruise/bleed easily. All other systems reviewed and are negative. Except as noted above the remainder of the review of systems was reviewed and negative. PAST MEDICAL HISTORY   No past medical history on file. SURGICALHISTORY     No past surgical history on file. CURRENT MEDICATIONS       Previous Medications    ASCORBIC ACID (OCTAVIO-C PO)    Take by mouth    ASPIRIN 81 MG TABLET    Take 81 mg by mouth daily    CHOLECALCIFEROL (VITAMIN D3) 1000 UNITS TABS    Take by mouth    CIPROFLOXACIN (CIPRO) 500 MG TABLET    Take 1 tablet by mouth 2 times daily for 7 days    COENZYME Q10 (COQ-10) 200 MG CAPS    Take by mouth    FAMOTIDINE (PEPCID) 40 MG TABLET    Take 1 tablet by mouth nightly as needed (acid reflux)    LACTULOSE (CHRONULAC) 10 GM/15ML SOLUTION    Take 30 mLs by mouth daily as needed (Prn constipation)    LORAZEPAM (ATIVAN) 1 MG TABLET    Take 1 tablet by mouth nightly as needed (Insomnia) for up to 12 days.     MAGNESIUM CITRATE SOLUTION    300 ml Once daily prn constipation    MULTIPLE VITAMINS-MINERALS ([address] 50+ PO)    Take by mouth    OMEGA-3 FATTY ACIDS (FISH OIL) 1000 MG CAPS    Take 1,000 mg by mouth 3 times daily    PHENAZOPYRIDINE (PYRIDIUM) 200 MG TABLET    Take 1 tablet by mouth 3 times daily as needed for Pain (bladder spasm/pain)    POLYETHYLENE GLYCOL (GLYCOLAX) 17 GM/SCOOP POWDER    Take 17 g by mouth daily as needed (constipation) SULFAMETHOXAZOLE-TRIMETHOPRIM (BACTRIM DS;SEPTRA DS) 800-160 MG PER TABLET    Take 1 tablet by mouth 2 times daily for 15 days    ZINC PO    Take by mouth    ZOLPIDEM (AMBIEN) 5 MG TABLET    Take 1 tablet by mouth nightly as needed for Sleep for up to 7 days. ALLERGIES     Patient has no known allergies. FAMILY HISTORY     No family history on file.        SOCIAL HISTORY       Social History     Socioeconomic History    Marital status:      Spouse name: Not on file    Number of children: Not on file    Years of education: Not on file    Highest education level: Not on file   Occupational History    Not on file   Social Needs    Financial resource strain: Not on file    Food insecurity     Worry: Never true     Inability: Never true    Transportation needs     Medical: No     Non-medical: No   Tobacco Use    Smoking status: Former Smoker     Packs/day: 2.00     Years: 27.00     Pack years: 54.00     Start date:      Last attempt to quit: 1986     Years since quittin.8    Smokeless tobacco: Never Used    Tobacco comment: quit 32 years ago   Substance and Sexual Activity    Alcohol use: Yes     Comment: occasionally    Drug use: Never    Sexual activity: Yes     Partners: Female   Lifestyle    Physical activity     Days per week: Not on file     Minutes per session: Not on file    Stress: Not on file   Relationships    Social connections     Talks on phone: Not on file     Gets together: Not on file     Attends Jew service: Not on file     Active member of club or organization: Not on file     Attends meetings of clubs or organizations: Not on file     Relationship status: Not on file    Intimate partner violence     Fear of current or ex partner: Not on file     Emotionally abused: Not on file     Physically abused: Not on file     Forced sexual activity: Not on file   Other Topics Concern    Not on file   Social History Narrative    Not on file       SCREENINGS @FLOW([id number])@      PHYSICAL EXAM    (up to 7 for level 4, 8 or more for level 5)     ED Triage Vitals [10/27/20 0715]   BP Temp Temp Source Pulse Resp SpO2 Height Weight   133/85 97.8 °F (36.6 °C) Temporal 75 17 99 % 6' 8\" (2.032 m) 218 lb (98.9 kg)       Physical Exam  Vitals signs and nursing note reviewed. Constitutional:       General: He is awake. He is not in acute distress. Appearance: Normal appearance. He is well-developed and normal weight. He is not ill-appearing, toxic-appearing or diaphoretic. Comments: No photophobia. No phonophobia. HENT:      Head: Normocephalic and atraumatic. No Leger's sign. Comments: No mastoid tenderness to palpation. Right Ear: Tympanic membrane, ear canal and external ear normal.      Left Ear: Tympanic membrane, ear canal and external ear normal.      Nose: Nose normal. No congestion or rhinorrhea. Mouth/Throat:      Mouth: Mucous membranes are moist.      Pharynx: Oropharynx is clear. No oropharyngeal exudate or posterior oropharyngeal erythema. Eyes:      General: No scleral icterus. Right eye: No foreign body or discharge. Left eye: No discharge. Extraocular Movements: Extraocular movements intact. Conjunctiva/sclera: Conjunctivae normal.      Left eye: No exudate. Pupils: Pupils are equal, round, and reactive to light. Comments: No anisocoria. Neck:      Musculoskeletal: Normal range of motion and neck supple. No neck rigidity. Vascular: No JVD. Trachea: No tracheal deviation. Comments: No meningismus. Cardiovascular:      Rate and Rhythm: Normal rate and regular rhythm. Pulses: Normal pulses. Heart sounds: Normal heart sounds. Heart sounds not distant. No murmur. No friction rub. No gallop. Pulmonary:      Effort: Pulmonary effort is normal. No respiratory distress. Breath sounds: Normal breath sounds. No stridor. No wheezing, rhonchi or rales.    Chest: Chest wall: No tenderness. Abdominal:      General: Abdomen is flat. Bowel sounds are normal. There is no distension. Palpations: Abdomen is soft. There is no mass. Tenderness: There is no abdominal tenderness. There is no right CVA tenderness, left CVA tenderness, guarding or rebound. Hernia: No hernia is present. Musculoskeletal: Normal range of motion. General: No swelling, tenderness, deformity or signs of injury. Comments: No midline tenderness to palpation of the C, T or L-spine. No step-off of the C, T or L-spine. Lymphadenopathy:      Head:      Right side of head: No submental adenopathy. Left side of head: No submental adenopathy. Skin:     General: Skin is warm and dry. Capillary Refill: Capillary refill takes less than 2 seconds. Coloration: Skin is not jaundiced or pale. Findings: No bruising, erythema, lesion or rash. Neurological:      General: No focal deficit present. Mental Status: He is alert and oriented to person, place, and time. Mental status is at baseline. Cranial Nerves: No cranial nerve deficit. Sensory: No sensory deficit. Motor: No weakness. Coordination: Coordination normal.      Deep Tendon Reflexes: Reflexes are normal and symmetric. Psychiatric:         Mood and Affect: Mood normal.         Behavior: Behavior normal. Behavior is cooperative. Thought Content:  Thought content normal.         Judgment: Judgment normal.         DIAGNOSTIC RESULTS     EKG: All EKG's are interpreted by the Emergency Department Physician who either signs or Co-signsthis chart in the absence of a cardiologist.        RADIOLOGY:   Non-plain filmimages such as CT, Ultrasound and MRI are read by the radiologist. Plain radiographic images are visualized and preliminarily interpreted by the emergency physician with the below findings:        Interpretation per the Radiologist below, if available at the time ofthis note:    XR CHEST (2 VW)   Final Result   No radiographic evidence of acute intrathoracic process. EXAMINATION: CT HEAD WO CONTRAST, ), 10/27/2020 8:41 AM       CLINICAL HISTORY:  top of head pain, rigors       COMPARISON: None      TECHNIQUE:  Multiple contiguous axial images of the head were obtained from the skull base through the skull vertex without intravenous contrast. Sagittal and coronal reformats were obtained. All CT scans at this facility use dose modulation, iterative reconstruction, and/or weight based dosing when appropriate to reduce radiation dose to as low as reasonably achievable. FINDINGS:    Gray-white matter differentiation is maintained. No acute hemorrhage, mass, mass effect, or midline shift. There is prominence of sulci and ventricles indicating mild global cerebral atrophy and chronic involutional changes. The subcortical and periventricular white matter is within normal limits. The basal ganglia are within normal limits. The visualized portions of the orbits are within normal limits. The globes are intact. The imaged portions of the paranasal sinuses are unremarkable. The calvarium is intact. Incidentally noted is a 16 x 26 mm fat density lesion along the superior rim of    the right orbit which likely represents a small lipoma extending inferiorly to the right superior eyelid. IMPRESSION:    There are no acute intracranial changes. CT HEAD WO CONTRAST   Final Result   No radiographic evidence of acute intrathoracic process. EXAMINATION: CT HEAD WO CONTRAST, ), 10/27/2020 8:41 AM       CLINICAL HISTORY:  top of head pain, rigors       COMPARISON: None      TECHNIQUE:  Multiple contiguous axial images of the head were obtained from the skull base through the skull vertex without intravenous contrast. Sagittal and coronal reformats were obtained.    All CT scans at this facility use dose modulation, iterative reconstruction, and/or weight based dosing when appropriate to reduce radiation dose to as low as reasonably achievable. FINDINGS:    Gray-white matter differentiation is maintained. No acute hemorrhage, mass, mass effect, or midline shift. There is prominence of sulci and ventricles indicating mild global cerebral atrophy and chronic involutional changes. The subcortical and periventricular white matter is within normal limits. The basal ganglia are within normal limits. The visualized portions of the orbits are within normal limits. The globes are intact. The imaged portions of the paranasal sinuses are unremarkable. The calvarium is intact. Incidentally noted is a 16 x 26 mm fat density lesion along the superior rim of    the right orbit which likely represents a small lipoma extending inferiorly to the right superior eyelid. IMPRESSION:    There are no acute intracranial changes. ED BEDSIDE ULTRASOUND:   Performed by ED Physician - none    LABS:  Labs Reviewed   CBC WITH AUTO DIFFERENTIAL - Abnormal; Notable for the following components:       Result Value    MCH 32.0 (*)     All other components within normal limits   COMPREHENSIVE METABOLIC PANEL - Abnormal; Notable for the following components:    Glucose 105 (*)     All other components within normal limits   RAPID INFLUENZA A/B ANTIGENS   RAPID STREP SCREEN   CULTURE, BLOOD 1   CULTURE, BLOOD 2   TSH WITHOUT REFLEX   PROCALCITONIN   HIGH SENSITIVITY CRP   LACTIC ACID, PLASMA   SEDIMENTATION RATE   PTH, INTACT   MONONUCLEOSIS SCREEN   PROTIME-INR   APTT   D-DIMER, QUANTITATIVE   COVID-19   RPR       All other labs were within normal range or not returned as of this dictation.     EMERGENCY DEPARTMENT COURSE and DIFFERENTIAL DIAGNOSIS/MDM:   Vitals:    Vitals:    10/27/20 0715 10/27/20 0826 10/27/20 0931 10/27/20 0943   BP: 133/85 (!) 136/92 (!) 158/99 (!) 158/99   Pulse: 75 73 77 69   Resp: 17 18     Temp: 97.8 °F (36.6 °C) TempSrc: Temporal      SpO2: 99% 100%     Weight: 218 lb (98.9 kg)      Height: 6' 8\" (2.032 m)              MDM  Usually the patient stated that he is having difficulty with daily activities however upon further questioning he is able to walk without any difficulty. Patient's had multiple evaluations. I had offered the patient admission for further work-up and he refuses. He states he is underinsured and he is refusing to stay in the hospital.  The patient received an additional liter of IV fluids in addition to the 500 cc of saline. BUN to creatinine ratio is greater than 22 1 indicating dehydration. Patient's pro calcitonin, hs-CRP are within normal limits. The patient is to maintain his current regimen for the prostate treatment on Bactrim that he is currently on. Some of the symptoms may have been side effects of a maurisio quinolone. The patient is wife are present on reexamination. The patient vehmently refuses admission to the hospital.    The patient was invited to return to the emergency room if his symptoms are worsening. CONSULTS:  None    PROCEDURES:  Unless otherwise noted below, none     Procedures    FINAL IMPRESSION      1. Orthostatic dizziness    2. Dehydration    3. Feeling weak    4. Chills (without fever)    5. Scalp tenderness          DISPOSITION/PLAN   DISPOSITION Discharge - Pending Orders Complete 10/27/2020 09:38:28 AM      PATIENT REFERRED TO:  No follow-up provider specified.     DISCHARGE MEDICATIONS:  New Prescriptions    No medications on file          (Please note that portions of this note were completed with a voice recognition program.  Efforts were made to edit the dictations but occasionally words are mis-transcribed.)    Luis Garcia DO (electronically signed)  Attending Emergency Physician          Luis Garcia DO  10/27/20 5571

## 2020-10-29 ENCOUNTER — TELEPHONE (OUTPATIENT)
Dept: FAMILY MEDICINE CLINIC | Age: 70
End: 2020-10-29

## 2020-10-29 LAB
BLOOD CULTURE, ROUTINE: NORMAL
CULTURE, BLOOD 2: NORMAL

## 2020-10-29 NOTE — TELEPHONE ENCOUNTER
Should still be normal  If new, worsening, persistent issues call urology immediatly even if after hours if not call 911 or (he mentioned earlier he might want to go to [facility])

## 2020-10-29 NOTE — TELEPHONE ENCOUNTER
Pt aware, says Dr Viktor Long keeps telling him to call you. Pt wants new referral for Urologist, states current one hasn't helped. Says still has the infection cant take the meds (bactrim and cipro) wants to know if you can offer something mild.

## 2020-10-30 ENCOUNTER — TELEPHONE (OUTPATIENT)
Dept: FAMILY MEDICINE CLINIC | Age: 70
End: 2020-10-30

## 2020-10-30 ENCOUNTER — HOSPITAL ENCOUNTER (EMERGENCY)
Age: 70
Discharge: HOME | End: 2020-10-30
Attending: STUDENT IN AN ORGANIZED HEALTH CARE EDUCATION/TRAINING PROGRAM

## 2020-10-30 VITALS
HEIGHT: 78 IN | DIASTOLIC BLOOD PRESSURE: 109 MMHG | HEART RATE: 83 BPM | OXYGEN SATURATION: 99 % | WEIGHT: 218 LBS | BODY MASS INDEX: 25.22 KG/M2 | RESPIRATION RATE: 15 BRPM | SYSTOLIC BLOOD PRESSURE: 135 MMHG | TEMPERATURE: 97.9 F

## 2020-10-30 LAB
ALBUMIN SERPL-MCNC: 4.4 G/DL (ref 3.5–4.6)
ALP BLD-CCNC: 56 U/L (ref 35–104)
ALT SERPL-CCNC: 20 U/L (ref 0–41)
ANION GAP SERPL CALCULATED.3IONS-SCNC: 14 MEQ/L (ref 9–15)
AST SERPL-CCNC: 18 U/L (ref 0–40)
BASOPHILS ABSOLUTE: 0.1 K/UL (ref 0–0.2)
BASOPHILS RELATIVE PERCENT: 1.2 %
BILIRUB SERPL-MCNC: 0.6 MG/DL (ref 0.2–0.7)
BILIRUBIN URINE: NEGATIVE
BLOOD, URINE: NEGATIVE
BUN BLDV-MCNC: 14 MG/DL (ref 8–23)
CALCIUM SERPL-MCNC: 10.5 MG/DL (ref 8.5–9.9)
CHLORIDE BLD-SCNC: 97 MEQ/L (ref 95–107)
CLARITY: CLEAR
CO2: 25 MEQ/L (ref 20–31)
COLOR: YELLOW
CREAT SERPL-MCNC: 0.85 MG/DL (ref 0.7–1.2)
EOSINOPHILS ABSOLUTE: 0 K/UL (ref 0–0.7)
EOSINOPHILS RELATIVE PERCENT: 0.7 %
GFR AFRICAN AMERICAN: >60
GFR NON-AFRICAN AMERICAN: >60
GLOBULIN: 2.9 G/DL (ref 2.3–3.5)
GLUCOSE BLD-MCNC: 103 MG/DL (ref 70–99)
GLUCOSE URINE: NEGATIVE MG/DL
HCT VFR BLD CALC: 46.1 % (ref 42–52)
HEMOGLOBIN: 15.7 G/DL (ref 14–18)
KETONES, URINE: NEGATIVE MG/DL
LACTIC ACID: 2 MMOL/L (ref 0.5–2.2)
LEUKOCYTE ESTERASE, URINE: NEGATIVE
LYMPHOCYTES ABSOLUTE: 1.4 K/UL (ref 1–4.8)
LYMPHOCYTES RELATIVE PERCENT: 20.5 %
MAGNESIUM: 2 MG/DL (ref 1.7–2.4)
MCH RBC QN AUTO: 32.2 PG (ref 27–31.3)
MCHC RBC AUTO-ENTMCNC: 34.1 % (ref 33–37)
MCV RBC AUTO: 94.5 FL (ref 80–100)
MONOCYTES ABSOLUTE: 0.5 K/UL (ref 0.2–0.8)
MONOCYTES RELATIVE PERCENT: 7.4 %
NEUTROPHILS ABSOLUTE: 4.7 K/UL (ref 1.4–6.5)
NEUTROPHILS RELATIVE PERCENT: 70.2 %
NITRITE, URINE: NEGATIVE
PDW BLD-RTO: 14 % (ref 11.5–14.5)
PH UA: 7 (ref 5–9)
PLATELET # BLD: 218 K/UL (ref 130–400)
POTASSIUM SERPL-SCNC: 4.6 MEQ/L (ref 3.4–4.9)
PROCALCITONIN: 0.03 NG/ML (ref 0–0.15)
PROTEIN UA: NEGATIVE MG/DL
RBC # BLD: 4.88 M/UL (ref 4.7–6.1)
SODIUM BLD-SCNC: 136 MEQ/L (ref 135–144)
SPECIFIC GRAVITY UA: 1 (ref 1–1.03)
TOTAL PROTEIN: 7.3 G/DL (ref 6.3–8)
TROPONIN: <0.01 NG/ML (ref 0–0.01)
URINE REFLEX TO CULTURE: NORMAL
UROBILINOGEN, URINE: 0.2 E.U./DL
WBC # BLD: 6.7 K/UL (ref 4.8–10.8)

## 2020-10-30 PROCEDURE — 85025 COMPLETE CBC W/AUTO DIFF WBC: CPT

## 2020-10-30 PROCEDURE — 84145 PROCALCITONIN (PCT): CPT

## 2020-10-30 PROCEDURE — 81003 URINALYSIS AUTO W/O SCOPE: CPT

## 2020-10-30 PROCEDURE — 36415 COLL VENOUS BLD VENIPUNCTURE: CPT

## 2020-10-30 PROCEDURE — 99284 EMERGENCY DEPT VISIT MOD MDM: CPT

## 2020-10-30 PROCEDURE — 83735 ASSAY OF MAGNESIUM: CPT

## 2020-10-30 PROCEDURE — 80053 COMPREHEN METABOLIC PANEL: CPT

## 2020-10-30 PROCEDURE — 84484 ASSAY OF TROPONIN QUANT: CPT

## 2020-10-30 PROCEDURE — 2580000003 HC RX 258: Performed by: PHYSICIAN ASSISTANT

## 2020-10-30 PROCEDURE — 83605 ASSAY OF LACTIC ACID: CPT

## 2020-10-30 RX ORDER — 0.9 % SODIUM CHLORIDE 0.9 %
1000 INTRAVENOUS SOLUTION INTRAVENOUS ONCE
Status: COMPLETED | OUTPATIENT
Start: 2020-10-30 | End: 2020-10-30

## 2020-10-30 RX ADMIN — SODIUM CHLORIDE 1000 ML: 9 INJECTION, SOLUTION INTRAVENOUS at 07:28

## 2020-10-30 ASSESSMENT — ENCOUNTER SYMPTOMS
SHORTNESS OF BREATH: 0
COUGH: 0
RHINORRHEA: 0
DIARRHEA: 0
BACK PAIN: 0
SORE THROAT: 0
NAUSEA: 0
PHOTOPHOBIA: 0
EYE PAIN: 0
VOMITING: 0
ABDOMINAL PAIN: 0

## 2020-10-30 NOTE — ED PROVIDER NOTES
file.      SURGICALHISTORY     No past surgical history on file. CURRENT MEDICATIONS       Discharge Medication List as of 10/30/2020  9:23 AM      CONTINUE these medications which have NOT CHANGED    Details   zolpidem (AMBIEN) 5 MG tablet Take 1 tablet by mouth nightly as needed for Sleep for up to 7 days. , Disp-7 tablet,R-0Print      lactulose (CHRONULAC) 10 GM/15ML solution Take 30 mLs by mouth daily as needed (Prn constipation), Disp-1 Bottle,R-0Normal      magnesium citrate solution 300 ml Once daily prn constipation, Disp-1500 mL,R-0Normal      LORazepam (ATIVAN) 1 MG tablet Take 1 tablet by mouth nightly as needed (Insomnia) for up to 12 days. , Disp-12 tablet,R-0Print      famotidine (PEPCID) 40 MG tablet Take 1 tablet by mouth nightly as needed (acid reflux), Disp-30 tablet,R-1Normal      ZINC PO Take by mouthHistorical Med      Ascorbic Acid (OCTAVIO-C PO) Take by mouthHistorical Med      polyethylene glycol (GLYCOLAX) 17 GM/SCOOP powder Take 17 g by mouth daily as needed (constipation), Disp-510 g,R-0Print      Omega-3 Fatty Acids (FISH OIL) 1000 MG CAPS Take 1,000 mg by mouth 3 times daily      Multiple Vitamins-Minerals (SUNVITE ACTIVE ADULT 50+ PO) Take by mouth      Coenzyme Q10 (COQ-10) 200 MG CAPS Take by mouth      aspirin 81 MG tablet Take 81 mg by mouth daily      Cholecalciferol (VITAMIN D3) 1000 UNITS TABS Take by mouth             ALLERGIES     Bactrim [sulfamethoxazole-trimethoprim] and Ciprofloxacin    FAMILY HISTORY     No family history on file.        SOCIAL HISTORY       Social History     Socioeconomic History    Marital status:      Spouse name: Not on file    Number of children: Not on file    Years of education: Not on file    Highest education level: Not on file   Occupational History    Not on file   Social Needs    Financial resource strain: Not on file    Food insecurity     Worry: Never true     Inability: Never true    Transportation needs     Medical: No Non-medical: No   Tobacco Use    Smoking status: Former Smoker     Packs/day: 2.00     Years: 27.00     Pack years: 54.00     Start date:      Last attempt to quit: 1986     Years since quittin.8    Smokeless tobacco: Never Used    Tobacco comment: quit 32 years ago   Substance and Sexual Activity    Alcohol use: Not Currently     Comment: occasionally    Drug use: Never    Sexual activity: Yes     Partners: Female   Lifestyle    Physical activity     Days per week: Not on file     Minutes per session: Not on file    Stress: Not on file   Relationships    Social connections     Talks on phone: Not on file     Gets together: Not on file     Attends Hoahaoism service: Not on file     Active member of club or organization: Not on file     Attends meetings of clubs or organizations: Not on file     Relationship status: Not on file    Intimate partner violence     Fear of current or ex partner: Not on file     Emotionally abused: Not on file     Physically abused: Not on file     Forced sexual activity: Not on file   Other Topics Concern    Not on file   Social History Narrative    Not on file       SCREENINGS      @AUX([id number])@      PHYSICAL EXAM    (up to 7 for level 4, 8 or more for level 5)     ED Triage Vitals [10/30/20 06]   BP Temp Temp Source Pulse Resp SpO2 Height Weight   (!) 131/92 97.9 °F (36.6 °C) Oral 118 21 99 % 6' 8\" (2.032 m) 218 lb (98.9 kg)       Physical Exam  Vitals signs and nursing note reviewed. Constitutional:       General: He is not in acute distress. Appearance: Normal appearance. He is well-developed. He is not diaphoretic. HENT:      Head: Normocephalic and atraumatic. Nose: Nose normal.      Mouth/Throat:      Mouth: Mucous membranes are moist.      Pharynx: Oropharynx is clear. Eyes:      General: Lids are normal.      Extraocular Movements: Extraocular movements intact.       Conjunctiva/sclera: Conjunctivae normal.      Pupils: Pupils are equal, round, and reactive to light. Neck:      Musculoskeletal: Normal range of motion and neck supple. Cardiovascular:      Rate and Rhythm: Normal rate and regular rhythm. Pulses: Normal pulses. Heart sounds: Normal heart sounds. Pulmonary:      Effort: Pulmonary effort is normal.      Breath sounds: Normal breath sounds. Abdominal:      General: Bowel sounds are normal.      Palpations: Abdomen is soft. Tenderness: There is no abdominal tenderness. Genitourinary:     Penis: Normal.    Musculoskeletal: Normal range of motion. Lymphadenopathy:      Cervical: No cervical adenopathy. Skin:     General: Skin is warm and dry. Capillary Refill: Capillary refill takes less than 2 seconds. Findings: No rash. Neurological:      Mental Status: He is alert and oriented to person, place, and time. Psychiatric:         Thought Content:  Thought content normal.         Judgment: Judgment normal.         DIAGNOSTIC RESULTS     EKG: All EKG's are interpreted by the Emergency Department Physician who either signs or Co-signsthis chart in the absence of a cardiologist.        RADIOLOGY:   Para [place] such as CT, Ultrasound and MRI are read by the radiologist. Plain radiographic images are visualized and preliminarily interpreted by the emergency physician with the below findings:        Interpretation per the Radiologist below, if available at the time ofthis note:    No orders to display         ED BEDSIDE ULTRASOUND:   Performed by ED Physician - none    LABS:  Labs Reviewed   COMPREHENSIVE METABOLIC PANEL - Abnormal; Notable for the following components:       Result Value    Glucose 103 (*)     Calcium 10.5 (*)     All other components within normal limits   CBC WITH AUTO DIFFERENTIAL - Abnormal; Notable for the following components:    MCH 32.2 (*)     All other components within normal limits   LACTIC ACID, PLASMA   URINE RT REFLEX TO CULTURE   PROCALCITONIN   MAGNESIUM TROPONIN       All other labs were within normal range or not returned as of this dictation. EMERGENCY DEPARTMENT COURSE and DIFFERENTIAL DIAGNOSIS/MDM:   Vitals:    Vitals:    10/30/20 0622 10/30/20 0652 10/30/20 0800 10/30/20 0900   BP: (!) 131/92  129/81 (!) 135/109   Pulse: 118  69 83   Resp: 21 20 19 15   Temp: 97.9 °F (36.6 °C)      TempSrc: Oral      SpO2: 99% 100% 100% 99%   Weight: 218 lb (98.9 kg)      Height: 6' 8\" (2.032 m)              MDM  Seen by Dr. Shailesh Ocasio   Patient is nontoxic no acute distress afebrile hemodynamically stable. othos positive given liter of fluid. I am not able to witness any of these tremors. He has a normal neurological exam. Had head ct done a few days ago that was normal. Dr. Shailesh Ocasio discussed disposition with patient. Pt will be d/c f/u with pcp and neuro. REASSESSMENT          CRITICAL CARE TIME   Total Critical Care time was  minutes, excluding separatelyreportable procedures. There was a high probability ofclinically significant/life threatening deterioration in the patient's condition which required my urgent intervention. CONSULTS:  None    PROCEDURES:  Unless otherwise noted below, none     Procedures    FINAL IMPRESSION      1. Orthostatic dizziness    2.  Shaking          DISPOSITION/PLAN   DISPOSITION Decision To Discharge 10/30/2020 09:23:17 AM      PATIENT REFERREDTO:  Armando Zapata MD  [address]  [phone]    Schedule an appointment as soon as possible for a visit in 2 days      Teofilo Limon MD  [address] [place] [address]  [phone]    Schedule an appointment as soon as possible for a visit in 2 days        DISCHARGEMEDICATIONS:  Discharge Medication List as of 10/30/2020  9:23 AM             (Please note that portions of this note were completed with a voice recognition program.  Efforts were made to edit the dictations but occasionally words are mis-transcribed.)    Charity May PA-C (electronically signed)  Attending Emergency Physician         Augusto Dubose PA-C  10/30/20 4807

## 2020-10-30 NOTE — ED NOTES
D/c instructions given. Pt verbalized understanding. Pt denies any further complaints at this time. Ambulated out with steady gait.      Margarita Gamino RN  10/30/20 4556

## 2020-10-30 NOTE — ED NOTES
Additional urine sent to lab at this time. Pt provided w/decaf coffee. Pt requesting to speak to Regina Benton. Regina Christiansen advised.      Nancy Farmer RN  10/30/20 2122

## 2020-10-30 NOTE — ED NOTES
Assumed care of patient. Denies any pain at this time. States he wants to speak to the physician while his wife is gone. NASH Christiansen/Dr Nuñez made aware.      Reji Funes RN  10/30/20 0654

## 2020-10-30 NOTE — TELEPHONE ENCOUNTER
Until I get back in touch with him have him take bactrim  Have him check with his pharmacist if he can take with food, milk, etc  I will have him get a second opinion from a different urologist  Will have details Monday  Call asap should something change or worsen.

## 2020-10-30 NOTE — TELEPHONE ENCOUNTER
Vladimir, according to Lea Landau he is to see him next week. We can make arrangements for him to be seen by someone else for second opinion if he calls the office.  Genie

## 2020-10-30 NOTE — ED TRIAGE NOTES
Patient to ED with c/o dizziness and twitching. Has had 6 recent visits for the same. Patient states happens more at night than during the day. Patient also states it seems to occur when attempting to urinate and that feels as though he is not emptying bladder when attempting to urinate. Recent antibiotic therapy for \"prostate infection. \"  Was at Dr. Calvillo Forward yesterday and instructed to stop taking Bactrim. Noted that allergies added to chart yesterday for Bactrim and Cipro that cause tremors and confusion.

## 2020-11-01 LAB
BLOOD CULTURE, ROUTINE: NORMAL
CULTURE, BLOOD 2: NORMAL

## 2020-11-03 ENCOUNTER — OFFICE VISIT (OUTPATIENT)
Dept: UROLOGY | Age: 70
End: 2020-11-03

## 2020-11-03 ENCOUNTER — VIRTUAL VISIT (OUTPATIENT)
Dept: FAMILY MEDICINE CLINIC | Age: 70
End: 2020-11-03
Payer: MEDICAID

## 2020-11-03 VITALS
HEIGHT: 78 IN | BODY MASS INDEX: 24.81 KG/M2 | SYSTOLIC BLOOD PRESSURE: 102 MMHG | DIASTOLIC BLOOD PRESSURE: 76 MMHG | WEIGHT: 214.4 LBS | HEART RATE: 85 BPM

## 2020-11-03 LAB
BILIRUBIN, POC: NORMAL
BLOOD URINE, POC: NORMAL
CLARITY, POC: CLEAR
COLOR, POC: YELLOW
GLUCOSE URINE, POC: NORMAL
KETONES, POC: NORMAL
LEUKOCYTE EST, POC: NORMAL
NITRITE, POC: NORMAL
PH, POC: 5.5
POST VOID RESIDUAL (PVR): 0 ML
PROTEIN, POC: NORMAL
SPECIFIC GRAVITY, POC: >=1.03
UROBILINOGEN, POC: 0.2

## 2020-11-03 PROCEDURE — 81003 URINALYSIS AUTO W/O SCOPE: CPT | Performed by: UROLOGY

## 2020-11-03 PROCEDURE — 99213 OFFICE O/P EST LOW 20 MIN: CPT | Performed by: UROLOGY

## 2020-11-03 PROCEDURE — 4040F PNEUMOC VAC/ADMIN/RCVD: CPT | Performed by: UROLOGY

## 2020-11-03 PROCEDURE — G8427 DOCREV CUR MEDS BY ELIG CLIN: HCPCS | Performed by: UROLOGY

## 2020-11-03 PROCEDURE — 99443 PR PHYS/QHP TELEPHONE EVALUATION 21-30 MIN: CPT | Performed by: INTERNAL MEDICINE

## 2020-11-03 PROCEDURE — 3017F COLORECTAL CA SCREEN DOC REV: CPT | Performed by: UROLOGY

## 2020-11-03 PROCEDURE — 51798 US URINE CAPACITY MEASURE: CPT | Performed by: UROLOGY

## 2020-11-03 PROCEDURE — 1123F ACP DISCUSS/DSCN MKR DOCD: CPT | Performed by: UROLOGY

## 2020-11-03 PROCEDURE — G8484 FLU IMMUNIZE NO ADMIN: HCPCS | Performed by: UROLOGY

## 2020-11-03 PROCEDURE — 1036F TOBACCO NON-USER: CPT | Performed by: UROLOGY

## 2020-11-03 PROCEDURE — G8420 CALC BMI NORM PARAMETERS: HCPCS | Performed by: UROLOGY

## 2020-11-03 RX ORDER — AMOXICILLIN AND CLAVULANATE POTASSIUM 500; 125 MG/1; MG/1
1 TABLET, FILM COATED ORAL 3 TIMES DAILY
COMMUNITY
End: 2020-11-19

## 2020-11-03 RX ORDER — IBUPROFEN 200 MG
200 TABLET ORAL EVERY 4 HOURS PRN
COMMUNITY
End: 2020-11-23 | Stop reason: CLARIF

## 2020-11-03 ASSESSMENT — ENCOUNTER SYMPTOMS
BACK PAIN: 0
SHORTNESS OF BREATH: 0
ABDOMINAL PAIN: 0
EYE PAIN: 0

## 2020-11-03 NOTE — PROGRESS NOTES
[facility] EVALUATION NOTE                                                 H&P                                                                                                                                                 Reason for Visit  Variable complaints, question of prostatitis, dizziness    History of Present Illness  18-year-old male with history of voiding symptoms that have remained stable  Patient also complaining of cold sweats and dizziness  Most likely dehydrated  Urinalysis today is clear  Residual minimal  Patient instructed to stop antibiotics altogether      Urologic Review of Systems/Symptoms  Voiding symptoms do not seem to be as severe as they once had been    Review of Systems  Hospitalization: None recent multiple ER admissions all tests cultures and CT reviewed no evidence of prostatitis or urologic issues at this point  All 14 categories of Review of Systems otherwise reviewed no other findings reported. History reviewed. No pertinent past medical history. History reviewed. No pertinent surgical history.   Social History     Socioeconomic History    Marital status:      Spouse name: None    Number of children: None    Years of education: None    Highest education level: None   Occupational History    None   Social Needs    Financial resource strain: None    Food insecurity     Worry: Never true     Inability: Never true    Transportation needs     Medical: No     Non-medical: No   Tobacco Use    Smoking status: Former Smoker     Packs/day: 2.00     Years: 27.00     Pack years: 54.00     Start date:      Last attempt to quit: 1986     Years since quittin.8    Smokeless tobacco: Never Used    Tobacco comment: quit 32 years ago   Substance and Sexual Activity    Alcohol use: Not Currently     Comment: occasionally    Drug use: Never    Sexual activity: Yes     Partners: Female   Lifestyle    Physical activity     Days per week: None     Minutes per session: None    Stress: None   Relationships    Social connections     Talks on phone: None     Gets together: None     Attends [employer] service: None     Active member of club or organization: None     Attends meetings of clubs or organizations: None     Relationship status: None    Intimate partner violence     Fear of current or ex partner: None     Emotionally abused: None     Physically abused: None     Forced sexual activity: None   Other Topics Concern    None   Social History Narrative    None     History reviewed. No pertinent family history. Current Outpatient Medications   Medication Sig Dispense Refill    ibuprofen (ADVIL;MOTRIN) 200 MG tablet Take 200 mg by mouth every 4 hours as needed for Pain      magnesium citrate solution 300 ml Once daily prn constipation 1500 mL 0    ZINC PO Take by mouth      Ascorbic Acid (OCTAVIO-C PO) Take by mouth      Omega-3 Fatty Acids (FISH OIL) 1000 MG CAPS Take 1,000 mg by mouth 3 times daily      amoxicillin-clavulanate (AUGMENTIN) 500-125 MG per tablet Take 1 tablet by mouth 3 times daily      lactulose (CHRONULAC) 10 GM/15ML solution Take 30 mLs by mouth daily as needed (Prn constipation) (Patient not taking: Reported on 11/3/2020) 1 Bottle 0    LORazepam (ATIVAN) 1 MG tablet Take 1 tablet by mouth nightly as needed (Insomnia) for up to 12 days.  (Patient not taking: Reported on 11/3/2020) 12 tablet 0    famotidine (PEPCID) 40 MG tablet Take 1 tablet by mouth nightly as needed (acid reflux) (Patient not taking: Reported on 11/3/2020) 30 tablet 1    polyethylene glycol (GLYCOLAX) 17 GM/SCOOP powder Take 17 g by mouth daily as needed (constipation) (Patient not taking: Reported on 11/3/2020) 510 g 0    Multiple Vitamins-Minerals (SUNVITE ACTIVE ADULT 50+ PO) Take by mouth      Coenzyme Q10 (COQ-10) 200 MG CAPS Take by mouth      aspirin 81 MG tablet Take 81 mg by mouth daily      Cholecalciferol (VITAMIN D3) 1000 UNITS TABS Take by mouth       No current facility-administered medications for this visit. Bactrim [sulfamethoxazole-trimethoprim] and Ciprofloxacin  All reviewed and verified by Dr Waldo Boeck on today's visit    No results found for: PSA, PSADIA  Results for POC orders placed in visit on 11/03/20   POCT Urinalysis No Micro (Auto)   Result Value Ref Range    Color, UA yellow     Clarity, UA clear     Glucose, UA POC neg     Bilirubin, UA neg     Ketones, UA neg     Spec Grav, UA >=1.030     Blood, UA POC neg     pH, UA 5.5     Protein, UA POC neg     Urobilinogen, UA 0.2     Leukocytes, UA neg     Nitrite, UA neg    poct post void residual   Result Value Ref Range    post void residual 0 ml    Narrative    A point of care test   Post Void Residual was completed by performing  ultrasound scan of the bladder and  reviewed by Dr Waldo Boeck       Physical Exam  Vitals:    11/03/20 0924   BP: 102/76   Pulse: 85   Weight: 214 lb 6.4 oz (97.3 kg)   Height: 6' 8\" (2.032 m)     Constitutional: Patient very anxious. Head and Neck: Normal  Cardiovascular: Normal rate, BP reviewed. Blood pressure is 102/76 heart rate 80  Pulmonary/Chest: Normal respiratory effort denies shortness of breath  Abdominal: Not distended. No suprapubic discomfort but postvoid residual  Urologic Exam  Postvoid residual 0. Urinalysis clear. Multiple admissions to ER data reviewed. Musculoskeletal: Ambulatory no edema. Extremities: No edema  Neurological: Intact  Skin: No rashes  Psychiatric: Anxious.   Assessment/Medical Necessity-Decision Making  Patient is dehydrated told to increase his hydration level increase salt level  Patient told to stop antibiotics  Plan  PSA 2 weeks follow-up at that time to review PSA  Greater than 50% of 20 minutes spent consulting patient face-to-face  Orders Placed This Encounter   Procedures    PSA, Diagnostic     Standing Status:   Future     Standing Expiration Date:   11/3/2021    POCT Urinalysis No Micro (Auto)    poct post void residual No orders of the defined types were placed in this encounter. Sonido Moore MD       Please note this report has been partially produced using speech recognition software  And may cause contain errors related to that system including grammar, punctuation and spelling as well as words and phrases that may seem inappropriate. If there are questions or concerns please feel free to contact me to clarify. No

## 2020-11-03 NOTE — PROGRESS NOTES
Subjective:      Patient ID: Jessica Christensen is a 69 y.o. male who presents today with:  No chief complaint on file. HPI    Here for multiple er follow up  So currently off anti biotics  Mentions he lost about 5 pounds  Past week despite eating  Is drinking a \"ton of fluids\"  Taking ensure. Still having a hard time going to the bathroom \"hard time peeing\"  After IV fluids in the ER \"he peed like crazy\"  He was told no infection in urine and bladder and was empty. Also mentions dehydration  Going to have a bowel movement once a day. Throughout the day feels like \"has to go\" but nothing comes out. Feels very weak. No diarrhea. Hasn't started pepcid yet  Afraid of side effects. No chest pain. No past medical history on file. No past surgical history on file.   Social History     Socioeconomic History    Marital status:      Spouse name: Not on file    Number of children: Not on file    Years of education: Not on file    Highest education level: Not on file   Occupational History    Not on file   Social Needs    Financial resource strain: Not on file    Food insecurity     Worry: Never true     Inability: Never true    Transportation needs     Medical: No     Non-medical: No   Tobacco Use    Smoking status: Former Smoker     Packs/day: 2.00     Years: 27.00     Pack years: 54.00     Start date:      Last attempt to quit: 1986     Years since quittin.8    Smokeless tobacco: Never Used    Tobacco comment: quit 32 years ago   Substance and Sexual Activity    Alcohol use: Not Currently     Comment: occasionally    Drug use: Never    Sexual activity: Yes     Partners: Female   Lifestyle    Physical activity     Days per week: Not on file     Minutes per session: Not on file    Stress: Not on file   Relationships    Social connections     Talks on phone: Not on file     Gets together: Not on file     Attends [employer] service: Not on file     Active member of club or organization: Not on file     Attends meetings of clubs or organizations: Not on file     Relationship status: Not on file    Intimate partner violence     Fear of current or ex partner: Not on file     Emotionally abused: Not on file     Physically abused: Not on file     Forced sexual activity: Not on file   Other Topics Concern    Not on file   Social History Narrative    Not on file     Allergies   Allergen Reactions    Bactrim [Sulfamethoxazole-Trimethoprim]      Tremors and confusion     Ciprofloxacin      Tremors and confusion      Current Outpatient Medications on File Prior to Visit   Medication Sig Dispense Refill    amoxicillin-clavulanate (AUGMENTIN) 500-125 MG per tablet Take 1 tablet by mouth 3 times daily      ibuprofen (ADVIL;MOTRIN) 200 MG tablet Take 200 mg by mouth every 4 hours as needed for Pain      lactulose (CHRONULAC) 10 GM/15ML solution Take 30 mLs by mouth daily as needed (Prn constipation) (Patient not taking: Reported on 11/3/2020) 1 Bottle 0    magnesium citrate solution 300 ml Once daily prn constipation 1500 mL 0    famotidine (PEPCID) 40 MG tablet Take 1 tablet by mouth nightly as needed (acid reflux) (Patient not taking: Reported on 11/3/2020) 30 tablet 1    ZINC PO Take by mouth      Ascorbic Acid (OCTAVIO-C PO) Take by mouth      polyethylene glycol (GLYCOLAX) 17 GM/SCOOP powder Take 17 g by mouth daily as needed (constipation) (Patient not taking: Reported on 11/3/2020) 510 g 0    Omega-3 Fatty Acids (FISH OIL) 1000 MG CAPS Take 1,000 mg by mouth 3 times daily      Multiple Vitamins-Minerals (SUNVITE ACTIVE ADULT 50+ PO) Take by mouth      Coenzyme Q10 (COQ-10) 200 MG CAPS Take by mouth      aspirin 81 MG tablet Take 81 mg by mouth daily      Cholecalciferol (VITAMIN D3) 1000 UNITS TABS Take by mouth       No current facility-administered medications on file prior to visit.         I have personally reviewed the ROS, PMH, PFH, and social history     Review of Systems   Constitutional: Negative for chills. Weakness    HENT: Negative for congestion. Eyes: Negative for pain. Respiratory: Negative for shortness of breath. Cardiovascular: Negative for chest pain. Gastrointestinal: Negative for abdominal pain. Genitourinary: Negative for hematuria. Musculoskeletal: Negative for back pain. Allergic/Immunologic: Negative for immunocompromised state. Neurological: Negative for headaches. Psychiatric/Behavioral: Negative for hallucinations. Objective: There were no vitals taken for this visit. Physical Exam    Unable to perform given telephone visit. Assessment:       Diagnosis Orders   1. Weakness  CT CHEST W WO CONTRAST    Amb External Referral To Urology    Cortisol Am, Total   2. Dyspepsia     3. Change in bowel habits  Michelle Rodriguez, Brunilda Cantrell   4. Screening for colon cancer  Estefany Rodriguez MD, Gastroenterology, [place]   5. Difficulty urinating  Amb External Referral To Urology         Plan:   Telephone visit done because of coronavirus pandemic. Time spent  21 minutes   explained billeable visit, patient understands and agrees to continue  I was at home and patient was at home during this visit. Offered him second opinion  See orders  Call asap should anything change   F/u after. Total time spent approx 30 minutes reviewing chart and labs.        Orders Placed This Encounter   Procedures    CT CHEST W WO CONTRAST     Standing Status:   Future     Standing Expiration Date:   11/3/2021    Cortisol Am, Total     Standing Status:   Future     Number of Occurrences:   1     Standing Expiration Date:   11/3/2021   [facility], [place]     Referral Priority:   Routine     Referral Type:   Eval and Treat     Referral Reason:   Specialty Services Required     Referred to Provider:   Colten Phillips MD     Requested Specialty:   Gastroenterology     Number of Visits Requested:   1    Amb External Referral To Urology     Referral Priority:   Routine     Referral Type:   Consult for Advice and Opinion     Requested Specialty:   Urology     Number of Visits Requested:   1     No orders of the defined types were placed in this encounter. Get blood test in the am.   Refused flu shot 2020   If anything should change or worsen call ASAP, don't wait for next scheduled appointment. Return in about 4 weeks (around 12/1/2020) for Chronic condition management/appointment, worsening symptoms, call ASAP for appointment.       Anais Kasper MD

## 2020-11-04 ENCOUNTER — TELEPHONE (OUTPATIENT)
Dept: FAMILY MEDICINE CLINIC | Age: 70
End: 2020-11-04

## 2020-11-04 DIAGNOSIS — R53.1 WEAKNESS: ICD-10-CM

## 2020-11-04 LAB — CORTISOL - AM: 15.1 UG/DL (ref 6.2–19.4)

## 2020-11-04 NOTE — TELEPHONE ENCOUNTER
Patient states he was told to call you if things changes, states he just walked the dog and hes out of wind, states his heart is racing until he sits down a while

## 2020-11-04 NOTE — TELEPHONE ENCOUNTER
Stress test ordered  Please have scheduling do it soon  Should he get any new, worsening, persistent symptoms call 911 for cardiac evaluation in er  [facility] please also order a 48 hour holter monitor diagnosis palpitations and let him know.  (to be read by CARDIOLOGY)

## 2020-11-04 NOTE — TELEPHONE ENCOUNTER
Ordered, scheduling will reach out to patient for Holter Monitor test. Josue De Jesus, can the stress test be scheduled soon or will it need to be STAT?

## 2020-11-05 ENCOUNTER — TELEPHONE (OUTPATIENT)
Dept: FAMILY MEDICINE CLINIC | Age: 70
End: 2020-11-05

## 2020-11-05 NOTE — TELEPHONE ENCOUNTER
Hello, I can see him tomorrow. I will ask my office to call him today and add him on.  Thanks, Florentin Romero

## 2020-11-06 ENCOUNTER — HOSPITAL ENCOUNTER (OUTPATIENT)
Dept: NON INVASIVE DIAGNOSTICS | Age: 70
Discharge: HOME | End: 2020-11-06

## 2020-11-06 ENCOUNTER — HOSPITAL ENCOUNTER (OUTPATIENT)
Dept: NUCLEAR MEDICINE | Age: 70
Discharge: HOME | End: 2020-11-08
Payer: MEDICAID

## 2020-11-06 ENCOUNTER — VIRTUAL VISIT (OUTPATIENT)
Dept: INFECTIOUS DISEASES | Age: 70
End: 2020-11-06
Payer: MEDICAID

## 2020-11-06 VITALS — SYSTOLIC BLOOD PRESSURE: 145 MMHG | HEART RATE: 93 BPM | DIASTOLIC BLOOD PRESSURE: 84 MMHG | RESPIRATION RATE: 18 BRPM

## 2020-11-06 LAB
LV EF: 52 %
LVEF MODALITY: NORMAL

## 2020-11-06 PROCEDURE — 2580000003 HC RX 258: Performed by: INTERNAL MEDICINE

## 2020-11-06 PROCEDURE — 78452 HT MUSCLE IMAGE SPECT MULT: CPT

## 2020-11-06 PROCEDURE — 78452 HT MUSCLE IMAGE SPECT MULT: CPT | Performed by: INTERNAL MEDICINE

## 2020-11-06 PROCEDURE — 93017 CV STRESS TEST TRACING ONLY: CPT

## 2020-11-06 PROCEDURE — 6360000002 HC RX W HCPCS: Performed by: INTERNAL MEDICINE

## 2020-11-06 PROCEDURE — A9502 TC99M TETROFOSMIN: HCPCS | Performed by: INTERNAL MEDICINE

## 2020-11-06 PROCEDURE — 99203 OFFICE O/P NEW LOW 30 MIN: CPT | Performed by: INTERNAL MEDICINE

## 2020-11-06 PROCEDURE — 3430000000 HC RX DIAGNOSTIC RADIOPHARMACEUTICAL: Performed by: INTERNAL MEDICINE

## 2020-11-06 RX ORDER — SODIUM CHLORIDE 0.9 % (FLUSH) 0.9 %
10 SYRINGE (ML) INJECTION PRN
Status: COMPLETED | OUTPATIENT
Start: 2020-11-06 | End: 2020-11-06

## 2020-11-06 RX ADMIN — TETROFOSMIN 12 MILLICURIE: 1.38 INJECTION, POWDER, LYOPHILIZED, FOR SOLUTION INTRAVENOUS at 08:06

## 2020-11-06 RX ADMIN — TETROFOSMIN 29.1 MILLICURIE: 1.38 INJECTION, POWDER, LYOPHILIZED, FOR SOLUTION INTRAVENOUS at 10:35

## 2020-11-06 RX ADMIN — Medication 10 ML: at 08:06

## 2020-11-06 RX ADMIN — Medication 10 ML: at 10:36

## 2020-11-06 RX ADMIN — Medication 10 ML: at 10:35

## 2020-11-06 RX ADMIN — REGADENOSON 0.4 MG: 0.08 INJECTION, SOLUTION INTRAVENOUS at 10:35

## 2020-11-06 NOTE — PROGRESS NOTES
Reviewed history, allergies, and medications. Consent confirmed. Lexiscan exam explained. Placed patient on monitor. @ [facility] here to inject Jr Tolentino. SOB noted during recovery phase. Denied chest pain. Many PVC's 1 couplet and 1 noted episiode of bigeminy. Patient off monitor and instructed to eat, will have last part of exam in 1 hour.

## 2020-11-06 NOTE — PROGRESS NOTES
Subjective:      Patient ID: Dionna Khan is a 69 y.o. male. HPI  Patient was informed that this is a billable visit. I am in my office, patient is at home/[place]. Reuben. me/ Tivix was used for communication and exam. Chart was reviewed and labs/ X Rays were discussed with the patient. our practice is making every effort to adhere to current recommendations, including social distancing. For the health and safety of our patients, and to prevent unnecessary exposure, we are currently scheduling telephone appointments. Patient was informed that these appointments are official appointments and will be billed through patient's insurance . Patient confirms this and agreed to the televised visit. Time spend in review of chart and on the IPAD using Doxy. me was >       minutes. Had pain in lower abdomen with frequency, was found to have severe constipated. Was treated with Cipro that was poorly tolerted with tremors and nightmares. Went back to ER for dizziness and confusion. Had CT head. Has been to ER x 6 weeks. Still has some trouble voiding. Having BM on Rx. Still with frequency. Has a feeling to have a BM and can not go. Still little foggy, nervous and shaky. Improving appetite, lost 8 lbs. Review of Systems   Constitutional: Positive for diaphoresis (night sweats). All other systems reviewed and are negative. Objective:   Physical Exam  Constitutional:       General: He is not in acute distress. HENT:      Head: Normocephalic. Nose: No congestion. Eyes:      General: No scleral icterus. Pulmonary:      Effort: Pulmonary effort is normal. No respiratory distress. Abdominal:      General: There is no distension. Tenderness: There is no abdominal tenderness. Musculoskeletal:         General: No swelling. Skin:     Coloration: Skin is not jaundiced. Findings: No rash. Neurological:      Mental Status: He is alert and oriented to person, place, and time.    Psychiatric: Mood and Affect: Mood normal.         Behavior: Behavior normal.       Impression         Diverticulosis, sigmoid colon, and to lesser extent, descending colon.                  Ca=10.5  CRP=0.5  Assessment:      Prostatitis  Constipation  Allergic reaction to Cipro      Plan:      Metamucil  Never had colonoscopy, will need one  Dried prunes 2-3 daily  Prune juice  F/U in 7-10 days  Prostate exam and antibiotics if sxs persist        Umberto Bang MD

## 2020-11-10 ENCOUNTER — HOSPITAL ENCOUNTER (OUTPATIENT)
Dept: NON INVASIVE DIAGNOSTICS | Age: 70
Discharge: HOME | End: 2020-11-10
Payer: MEDICAID

## 2020-11-10 ENCOUNTER — HOSPITAL ENCOUNTER (OUTPATIENT)
Dept: CT IMAGING | Age: 70
Discharge: HOME | End: 2020-11-12

## 2020-11-10 PROCEDURE — 93226 XTRNL ECG REC<48 HR SCAN A/R: CPT

## 2020-11-10 PROCEDURE — 93225 XTRNL ECG REC<48 HRS REC: CPT

## 2020-11-10 PROCEDURE — 71250 CT THORAX DX C-: CPT

## 2020-11-10 RX ORDER — SODIUM CHLORIDE 0.9 % (FLUSH) 0.9 %
10 SYRINGE (ML) INJECTION
Status: DISCONTINUED | OUTPATIENT
Start: 2020-11-10 | End: 2020-11-10

## 2020-11-12 ENCOUNTER — TELEPHONE (OUTPATIENT)
Dept: INFECTIOUS DISEASES | Age: 70
End: 2020-11-12

## 2020-11-12 RX ORDER — CEPHALEXIN 500 MG/1
500 CAPSULE ORAL 3 TIMES DAILY
Qty: 42 CAPSULE | Refills: 0 | Status: SHIPPED | OUTPATIENT
Start: 2020-11-12 | End: 2020-11-25 | Stop reason: SDUPTHER

## 2020-11-12 NOTE — TELEPHONE ENCOUNTER
Per Dr. Everton Hardy- patients' symptoms are a consitent with \" Prostatitis\"  Per Dr. Shanna Stiles 500 mg TID x 2 weeks  Script e-scripted to [facility]  Patient informed through [address] and phone.

## 2020-11-16 ENCOUNTER — VIRTUAL VISIT (OUTPATIENT)
Dept: INFECTIOUS DISEASES | Age: 70
End: 2020-11-16
Payer: MEDICAID

## 2020-11-16 PROCEDURE — 99213 OFFICE O/P EST LOW 20 MIN: CPT | Performed by: INTERNAL MEDICINE

## 2020-11-16 NOTE — PROGRESS NOTES
Subjective:      Patient ID: Franklyn Romano is a 69 y.o. male. HPI  Patient was informed that this is a billable visit. I am in my office, patient is at home/[place]. Reuben. me/ SiOx was used for communication and exam. Chart was reviewed and labs/ X Rays were discussed with the patient. our practice is making every effort to adhere to current recommendations, including social distancing. For the health and safety of our patients, and to prevent unnecessary exposure, we are currently scheduling telephone appointments. Patient was informed that these appointments are official appointments and will be billed through patient's insurance . Patient confirms this and agreed to the televised visit. Time spend in review of chart and on the IPAD using Doxy. me was 15       Minutes. Burping constantly, passing lot of gas. Has pain around navel. Resolved dysuria. Still with 4 x nocturia, still with frequency. F/U possible prostatitis, started on PO Keflex, tolerated. Started gaining weight back. Regular BM. PMHx, allergies and social Hx were reviewed    Review of Systems   All other systems reviewed and are negative. Objective:   Physical Exam  Constitutional:       General: He is not in acute distress. HENT:      Head: Normocephalic. Nose: No congestion. Eyes:      General: No scleral icterus. Pulmonary:      Effort: Pulmonary effort is normal. No respiratory distress. Abdominal:      General: There is no distension. Musculoskeletal:         General: No swelling. Skin:     Coloration: Skin is not jaundiced. Findings: No erythema or rash. Neurological:      Mental Status: He is alert and oriented to person, place, and time.    Psychiatric:         Mood and Affect: Mood normal.         Behavior: Behavior normal.         Assessment:      Acute prostatitis  Bloating      Plan:      Refer to GI specialist  Continue Keflex  phazyme 180 mg daily        Miguel Angel Darden MD

## 2020-11-17 ENCOUNTER — TELEPHONE (OUTPATIENT)
Dept: UROLOGY | Age: 70
End: 2020-11-17

## 2020-11-17 DIAGNOSIS — N40.0 BENIGN PROSTATIC HYPERPLASIA, UNSPECIFIED WHETHER LOWER URINARY TRACT SYMPTOMS PRESENT: ICD-10-CM

## 2020-11-17 LAB — PROSTATE SPECIFIC ANTIGEN: 9.23 NG/ML (ref 0–6.22)

## 2020-11-18 ENCOUNTER — VIRTUAL VISIT (OUTPATIENT)
Dept: GASTROENTEROLOGY | Age: 70
End: 2020-11-18
Payer: MEDICAID

## 2020-11-18 PROCEDURE — 99443 PR PHYS/QHP TELEPHONE EVALUATION 21-30 MIN: CPT | Performed by: INTERNAL MEDICINE

## 2020-11-18 RX ORDER — ACTIVATED CHARCOAL 260 MG
CAPSULE ORAL
Qty: 120 CAPSULE | Refills: 3 | Status: SHIPPED | OUTPATIENT
Start: 2020-11-18 | End: 2021-03-16

## 2020-11-18 RX ORDER — GREEN TEA/HOODIA GORDONII 315-12.5MG
1 CAPSULE ORAL 2 TIMES DAILY
Qty: 60 TABLET | Refills: 0 | Status: SHIPPED | OUTPATIENT
Start: 2020-11-18 | End: 2020-12-18

## 2020-11-18 NOTE — PROGRESS NOTES
2020    TELEHEALTH EVALUATION -- Audio/Visual (During AMRBE-73 public health emergency)    Due to COVID 19 outbreak, patient's office visit was converted to a virtual visit. Patient was contacted and agreed to proceed with a virtual visit via Telephone Visit  The risks and benefits of converting to a virtual visit were discussed in light of the current infectious disease epidemic. Patient also understood that insurance coverage and co-pays are up to their individual insurance plans. Chief Complaint   Patient presents with    New Patient     Pt states he can't stop belching and flatuating. Umbilical pain. Daily BM. Denies N/V. Never had a colonoscopy. HPI:  Patient Location: Home  Provider location: Office    Margydewayne Juarez (:  1950) has requested an audio/video evaluation for the following concern(s):    Excessive burping, belching and flatulence with abdominal discomfort. Patient reports worsening symptoms over the last week with significant burping and belching. Patient reports history of having these symptoms for many years however symptoms got particularly worse over the last week, patient s/p antibiotic treatment for last 6 weeks for prostatitis. He reports taking Phazyme for last 2 days without benefit. Reports bout of severe constipation 4 weeks ago, since then has been taking MiraLAX, prunes/prune juice, now with normal consistency daily bowel movements. Patient reports gradual loss of weight over the last 5 to 6 years. Denies any significant weight loss in the recent past.    Previous GI work up/Endoscopic investigations: No previous colonoscopy -patient not keen to undergo colonoscopy due to concerns with prep    Review of Systems   All other systems reviewed and are negative. Prior to Visit Medications    Medication Sig Taking?  Authorizing Provider   activated vegetable charcoal (CHARCOCAPS) 260 MG capsule Take 1 capsule 4 times a day as needed Yes Genie Haley MD Probiotic Acidophilus (FLORANEX) TABS Take 1 tablet by mouth 2 times daily Yes Carleen Davalos MD   cephALEXin (KEFLEX) 500 MG capsule Take 1 capsule by mouth 3 times daily for 14 days Yes Angelica Garg MD   amoxicillin-clavulanate (AUGMENTIN) 500-125 MG per tablet Take 1 tablet by mouth 3 times daily Yes Historical Provider, MD   ibuprofen (ADVIL;MOTRIN) 200 MG tablet Take 200 mg by mouth every 4 hours as needed for Pain Yes Historical Provider, MD   ZINC PO Take by mouth Yes Historical Provider, MD   Ascorbic Acid (OCTAVIO-C PO) Take by mouth Yes Historical Provider, MD   polyethylene glycol (GLYCOLAX) 17 GM/SCOOP powder Take 17 g by mouth daily as needed (constipation) Yes Letha Pinedo DO   Omega-3 Fatty Acids (FISH OIL) 1000 MG CAPS Take 1,000 mg by mouth 3 times daily Yes Historical Provider, MD   Multiple Vitamins-Minerals (SUNVITE ACTIVE ADULT 50+ PO) Take by mouth Yes Historical Provider, MD   Coenzyme Q10 (COQ-10) 200 MG CAPS Take by mouth Yes Historical Provider, MD   aspirin 81 MG tablet Take 81 mg by mouth daily Yes Historical Provider, MD   Cholecalciferol (VITAMIN D3) 1000 UNITS TABS Take by mouth Yes Historical Provider, MD   lactulose (CHRONULAC) 10 GM/15ML solution Take 30 mLs by mouth daily as needed (Prn constipation)  Patient not taking: Reported on 2020  Kieran Larson MD   magnesium citrate solution 300 ml Once daily prn constipation  Patient not taking: Reported on 2020  Kieran Larson MD   famotidine (PEPCID) 40 MG tablet Take 1 tablet by mouth nightly as needed (acid reflux)  Patient not taking: Reported on 2020  Kieran Larson MD       Social History     Tobacco Use    Smoking status: Former Smoker     Packs/day: 2.00     Years: 27.00     Pack years: 54.00     Start date:      Last attempt to quit: 1986     Years since quittin.9    Smokeless tobacco: Never Used    Tobacco comment: quit 32 years ago   Substance Use Topics    Alcohol use: Not Currently     Comment: occasionally    Drug use: Never        Allergies   Allergen Reactions    Bactrim [Sulfamethoxazole-Trimethoprim]      Tremors and confusion     Ciprofloxacin      Tremors and confusion    , History reviewed. No pertinent past medical history. , History reviewed. No pertinent surgical history. , History reviewed. No pertinent family history. Limited information on physical examination: Due to this being a telehealth visit, physical examination could not be performed    Laboratory, Pathology, Radiology reviewed indetail with relevant important investigations summarized below:  Lab Results   Component Value Date    WBC 6.7 10/30/2020    HGB 15.7 10/30/2020    HCT 46.1 10/30/2020    MCV 94.5 10/30/2020     10/30/2020     Lab Results   Component Value Date    ALT 20 10/30/2020    AST 18 10/30/2020    ALKPHOS 56 10/30/2020    BILITOT 0.6 10/30/2020     CT scan abdomen: 10/19/2020: Diverticulosis, sigmoid colon and descending colon    Assessment and Plan:  69 y.o. male with longstanding history of excessive belching burping and flatulence with significant worsening of symptoms over the last 1 week after 6 weeks of antibiotic therapy. Suspect alteration in microbiota secondary to prolonged antibiotic therapy. 1. Burping  2. Bloating  3. Flatulence  - Activated vegetable charcoal (CHARCOCAPS) 260 MG capsule; Take 1 capsule 4 times a day as needed    - Probiotic Acidophilus (FLORANEX) TABS; Take 1 tablet by mouth 2 times daily    Return in 2 to 4 weeks (around 12/9/2020). This visit was completed over telephone, with patient at their home and provider at the hospital, total time spent conversing with the patient 25 minutes, other personnel involved in the care are  staff and Medical assistant.      Sophie Phillips MD   Gastroenterology  [facility]    Pursuant to the emergency declaration under the [facility] [address] waiver authority and the [employer] Act, this Virtual Visit was conducted, with patient's consent, to reduce the patient's risk of exposure to COVID-19 and provide continuity of care for an established patient. Please note this report has been partially produced using speech recognition software and may cause contain errors related to thatsystem including grammar, punctuation and spelling as well as words and phrases that may seem inappropriate. If there are questions or concerns please feel free to contact me to clarify.

## 2020-11-19 ENCOUNTER — VIRTUAL VISIT (OUTPATIENT)
Dept: UROLOGY | Age: 70
End: 2020-11-19
Payer: MEDICAID

## 2020-11-19 PROCEDURE — 99441 PR PHYS/QHP TELEPHONE EVALUATION 5-10 MIN: CPT | Performed by: UROLOGY

## 2020-11-23 ENCOUNTER — OFFICE VISIT (OUTPATIENT)
Dept: FAMILY MEDICINE CLINIC | Age: 70
End: 2020-11-23
Payer: MEDICAID

## 2020-11-23 ENCOUNTER — TELEPHONE (OUTPATIENT)
Dept: FAMILY MEDICINE CLINIC | Age: 70
End: 2020-11-23

## 2020-11-23 VITALS
HEART RATE: 88 BPM | BODY MASS INDEX: 24.65 KG/M2 | RESPIRATION RATE: 15 BRPM | OXYGEN SATURATION: 98 % | SYSTOLIC BLOOD PRESSURE: 124 MMHG | DIASTOLIC BLOOD PRESSURE: 78 MMHG | HEIGHT: 78 IN | TEMPERATURE: 98 F | WEIGHT: 213 LBS

## 2020-11-23 PROCEDURE — 99214 OFFICE O/P EST MOD 30 MIN: CPT | Performed by: INTERNAL MEDICINE

## 2020-11-23 RX ORDER — OMEPRAZOLE 40 MG/1
40 CAPSULE, DELAYED RELEASE ORAL
Qty: 30 CAPSULE | Refills: 1 | Status: SHIPPED | OUTPATIENT
Start: 2020-11-23 | End: 2021-01-12 | Stop reason: DRUGHIGH

## 2020-11-23 ASSESSMENT — ENCOUNTER SYMPTOMS
ABDOMINAL PAIN: 0
BACK PAIN: 0
EYE PAIN: 0
SHORTNESS OF BREATH: 0

## 2020-11-23 NOTE — PROGRESS NOTES
Subjective:      Patient ID: Ben Olmstead is a 69 y.o. male who presents today with:  Chief Complaint   Patient presents with    Follow-up     patient c/o today build up of trapped gas in right abdominal area to the left, mentions can be very painful untilthe gas comes out or when he eats     Discuss Labs    Flu Vaccine     declined at this visit        HPI    \"trapped gas\" right mid  And every once of a while left lower  Better when he passes gas or belches. Dizziness is better  No longer light headed unless extreme in position  No longer pain in his private area  Saw infectious disease, was put on keflex again through infectious disease. Slight intermittent burning but not like it used  High psa at 9.23, has follow up with dr Valentina Marquez in a few months  Lung nodule approx 2 mm in size. Former smoker quit in [facility]. No past medical history on file. No past surgical history on file.   Social History     Socioeconomic History    Marital status:      Spouse name: Not on file    Number of children: Not on file    Years of education: Not on file    Highest education level: Not on file   Occupational History    Not on file   Social Needs    Financial resource strain: Not on file    Food insecurity     Worry: Never true     Inability: Never true    Transportation needs     Medical: No     Non-medical: No   Tobacco Use    Smoking status: Former Smoker     Packs/day: 2.00     Years: 27.00     Pack years: 54.00     Start date:      Last attempt to quit: 1986     Years since quittin.9    Smokeless tobacco: Never Used    Tobacco comment: quit 32 years ago   Substance and Sexual Activity    Alcohol use: Not Currently     Comment: occasionally    Drug use: Never    Sexual activity: Yes     Partners: Female   Lifestyle    Physical activity     Days per week: Not on file     Minutes per session: Not on file    Stress: Not on file   Relationships    Social connections     Talks on phone: Not on file     Gets together: Not on file     Attends Holiness service: Not on file     Active member of club or organization: Not on file     Attends meetings of clubs or organizations: Not on file     Relationship status: Not on file    Intimate partner violence     Fear of current or ex partner: Not on file     Emotionally abused: Not on file     Physically abused: Not on file     Forced sexual activity: Not on file   Other Topics Concern    Not on file   Social History Narrative    Not on file     Allergies   Allergen Reactions    Bactrim [Sulfamethoxazole-Trimethoprim]      Tremors and confusion     Ciprofloxacin      Tremors and confusion     Pepcid [Famotidine]      Current Outpatient Medications on File Prior to Visit   Medication Sig Dispense Refill    activated vegetable charcoal (CHARCOCAPS) 260 MG capsule Take 1 capsule 4 times a day as needed 120 capsule 3    Probiotic Acidophilus (FLORANEX) TABS Take 1 tablet by mouth 2 times daily 60 tablet 0    ZINC PO Take by mouth      Ascorbic Acid (OCTAVIO-C PO) Take by mouth      Omega-3 Fatty Acids (FISH OIL) 1000 MG CAPS Take 1,000 mg by mouth 3 times daily      Multiple Vitamins-Minerals (SUNVITE ACTIVE ADULT 50+ PO) Take by mouth      Coenzyme Q10 (COQ-10) 200 MG CAPS Take by mouth      Cholecalciferol (VITAMIN D3) 1000 UNITS TABS Take by mouth      aspirin 81 MG tablet Take 81 mg by mouth daily       No current facility-administered medications on file prior to visit. I have personally reviewed the ROS, PMH, PFH, and social history     Review of Systems   Constitutional: Negative for chills. HENT: Negative for congestion. Eyes: Negative for pain. Respiratory: Negative for shortness of breath. Cardiovascular: Negative for chest pain. Gastrointestinal: Negative for abdominal pain. Genitourinary: Negative for hematuria. Musculoskeletal: Negative for back pain.    Allergic/Immunologic: Negative for immunocompromised state.   Neurological: Negative for headaches. Psychiatric/Behavioral: Negative for hallucinations. Objective:   /78   Pulse 88   Temp 98 °F (36.7 °C) (Tympanic)   Resp 15   Ht 6' 8\" (2.032 m)   Wt 213 lb (96.6 kg)   SpO2 98%   BMI 23.40 kg/m²     Physical Exam  Constitutional:       Appearance: He is well-developed. HENT:      Head: Normocephalic. Eyes:      Pupils: Pupils are equal, round, and reactive to light. Neck:      Vascular: No carotid bruit. Trachea: No tracheal deviation. Cardiovascular:      Rate and Rhythm: Normal rate and regular rhythm. Pulses: Normal pulses. Heart sounds: Normal heart sounds. No murmur. No friction rub. No gallop. Pulmonary:      Effort: Pulmonary effort is normal. No respiratory distress. Breath sounds: Normal breath sounds. No wheezing or rales. Abdominal:      General: Bowel sounds are normal. There is no distension. Palpations: Abdomen is soft. Tenderness: There is no abdominal tenderness. There is no right CVA tenderness, left CVA tenderness, guarding or rebound. Musculoskeletal:      Right lower leg: No edema. Left lower leg: No edema. Skin:     General: Skin is warm and dry. Neurological:      Mental Status: He is oriented to person, place, and time. Assessment:       Diagnosis Orders   1. [address], Gastroenter[facility], [place]    omeprazole (PRILOSEC) 40 MG delayed release capsule   2. Lung nodule  CT CHEST WO CONTRAST   3. Bloating     4. Screening for colon cancer  Isidro [email or web address] MD, Gastroenterology, [place]         Plan:    vc  Continue chronic medications. Failed H2 Blocker, start ppi. Ct chest repeat in one year, risk of cancer. Low risk stress test.   Consider pancreas as cause vs GB vs other if no clear cause or no improvement with above.    .Discuss us aaa at next visit         Orders Placed This Encounter   Procedures    Erika Beavers Standing Status:   Future     Standing Expiration Date:   5/24/2022   [place] [facility], [facility], [place]     Referral Priority:   Routine     Referral Type:   Eval and Treat     Referral Reason:   Specialty Services Required     Referred to Provider:   Adam [place] MD     Requested Specialty:   Gastroenterology     Number of Visits Requested:   1     Orders Placed This Encounter   Medications    omeprazole (PRILOSEC) 40 MG delayed release capsule     Sig: Take 1 capsule by mouth every morning (before breakfast)     Dispense:  30 capsule     Refill:  1   Patient gave me permission to speak in front of friends/family, etc.   Keep follow up with ID (Dr Kelly Soto)  And urology. Risk of cancer. F/U with Holter. ppi  Explained risk of AIN, mg wasting, hip fracture, osteoporosis, etc.   Keep follow up with GI. Risk of colon cancer. If anything should change or worsen call ASAP, don't wait for next scheduled appointment. Return in 4 weeks (on 12/21/2020) for Chronic condition management/appointment, worsening symptoms, call ASAP for appointment.       Olman Soriano MD

## 2020-11-25 RX ORDER — CEPHALEXIN 500 MG/1
500 CAPSULE ORAL 3 TIMES DAILY
Qty: 42 CAPSULE | Refills: 0 | Status: SHIPPED | OUTPATIENT
Start: 2020-11-25 | End: 2020-12-09

## 2020-11-30 ENCOUNTER — VIRTUAL VISIT (OUTPATIENT)
Dept: INFECTIOUS DISEASES | Age: 70
End: 2020-11-30
Payer: MEDICAID

## 2020-11-30 ENCOUNTER — TELEPHONE (OUTPATIENT)
Dept: FAMILY MEDICINE CLINIC | Age: 70
End: 2020-11-30

## 2020-11-30 PROCEDURE — 99214 OFFICE O/P EST MOD 30 MIN: CPT | Performed by: INTERNAL MEDICINE

## 2020-11-30 NOTE — TELEPHONE ENCOUNTER
I have evaluated him to a virtual visit on 11/18/2020 and advised him to follow-up in 2 to 4 weeks after the last visit.   Thanks  Serge Cody

## 2020-11-30 NOTE — PROGRESS NOTES
2020    TELEHEALTH EVALUATION -- Audio/Visual (During FQFVA-03 public health emergency)      Pamela Hines (:  1950) has requested an audio/video evaluation for the following concern(s):    prostatitis  Due to the COVID-19 outbreak, patient's visit was converted to a virtual visit. Patient agreed to proceed with a virtual visit with me via Doxy. me with my location at the office. Patient reports their location. The risks and benefits of converting to a virtual visit were discussed in light of the current infectious disease epidemic. Services were provided through an audio/video synchronous discussion virtually to substitute for in person clinic visit. THIS virtual visit was conducted via interactive/real-time audio/video. Due to this being a TeleHealth encounter, evaluation of the following organ systems is limited: Vitals/Constitutional/EENT/Resp/CV/GI//MS/Neuro/Skin/Heme-Lymph-Imm.}    Pursuant to the emergency declaration under the [address] [place] [address], [facility] waiver authority and the [employer] Act, this Virtual Visit was conducted, with patient's consent, to reduce the patient's risk of exposure to COVID-19 and provide care for the patient. Infectious Disease Progress Note       2020    Patient is a followup regarding prostatitis  Previously referred to GI for bloating  Has taken course of Keflex. Still on the abx. Started on omeprazole by PCP for his symptoms. No burning with urine but he is having difficulty during urination and sometimes has to change positions. Feels relief and urinates best after having a bowel movement. Feels relief of \"pressure\"   He has never had a colonoscopy but has had constipation issues for years. Has an upcoming visit with GI. Subjectively, complains of bloating, gas.  Follows with urology     Since we cannot conduct an in-person exam, the following were addressed with the patient to the best of my capability via virtual visit:   Patient does not perceive any new visual deficits  No diaphoresis or flushing in the face  Patient is able to flex and extend her neck with ease. Patient can inhale and exhale without any difficulty and chest seems to be expanding symmetrically. No conversational dyspnea. Patient does not feel any palpitations   Patient's abdomen is not protruberant beyond their normal size. No new swelling of their joints. No observed neurological changes or slurred speech when speaking to the patient    No new skin rash or ulcers      Lab Results   Component Value Date    WBC 6.7 10/30/2020    HGB 15.7 10/30/2020    HCT 46.1 10/30/2020    MCV 94.5 10/30/2020     10/30/2020     Lab Results   Component Value Date     10/30/2020    K 4.6 10/30/2020    CL 97 10/30/2020    CO2 25 10/30/2020    BUN 14 10/30/2020    CREATININE 0.85 10/30/2020    GLUCOSE 103 10/30/2020    CALCIUM 10.5 10/30/2020        WBC trends are being monitored. Antibiotic doses are being adjusted per most recent renal labs. ASSESSMENT:  Prostatitis - s/p tx with keflex. Bloating and gas - likely due to prolonged abx  Constipation  Hx of BPH with elevated PSA    PLAN:  Follow up with GI and Urology as directed. Dc keflex  If urinary burning returns or foul smell or fevers/chills, then he can submit urine for UA/UC - there is already an order int he chart. Imaging and labs were reviewed per medical records and any ID pertinent labs were also addressed  Time spent in total on this chart >25 min    Social distancing measures, the importance of face masks that properly cover the nose and mouth in public, proper hand hygiene, and the importance of continued healthcare follow ups with primary care physicians and specialists will continue to be addressed    Patient's need for an influenza vaccine every fall was also discussed.    Risks and benefits of ID related prescribed medications were discussed with feedback obtained from the patient.      Karma rTejo DO

## 2020-11-30 NOTE — TELEPHONE ENCOUNTER
Sorry patient reached out to me on my chart and was concerned  I appreciate it  Sorry for the extra message    Staff let him know to keep appt with dr Lin Fore

## 2020-12-01 PROCEDURE — 93227 XTRNL ECG REC<48 HR R&I: CPT | Performed by: INTERNAL MEDICINE

## 2020-12-01 NOTE — PROCEDURES
[address]                      [address], [address] [facility]                                 HOLTER MONITOR    PATIENT NAME: Gosia Basilio                   :        1950  MED REC NO:   [id number]                            ROOM:  ACCOUNT NO:   [de-identified]                           ADMIT DATE: 11/10/2020  PROVIDER:     Sher Harper MD    HOLTER MONITOR 48-HOURS    DATE OF STUDY:  11/10/2020    ORDERING PROVIDERS:  Chalino Venegas MD    INDICATION:  Palpitation. COMMENTS:  The patient was monitored for 48 hours. Underlying rhythm is  normal sinus. Maximum heart rate achieved was 152 beats per minute. Minimum was 54 beats per minute. There were 5858 PVCs including one  4-beat long run of V-tach at 83 beats per minute. There were 65 PACs  including one 11-beat long run of nonsustained SVT at 145 beats per  minute. There were no significant pauses noted.         Giles Sanchez MD    D: 2020 11:40:46       T: 2020 15:22:31     [id number]  Job#: [id number]     Doc#: [id number]    CC:

## 2020-12-07 ENCOUNTER — TELEPHONE (OUTPATIENT)
Dept: FAMILY MEDICINE CLINIC | Age: 70
End: 2020-12-07

## 2020-12-07 NOTE — TELEPHONE ENCOUNTER
Patient calling stating he is not setting up appt with cardiologists until someone calls him with his tests results.

## 2020-12-07 NOTE — TELEPHONE ENCOUNTER
This was from the result note I sent on 12/02/2020    [facility] will need to see a cardiologist   Multiple extra beats on his holter would could be reasonsibe for this symptoms   Dr Tate Rivas can you review he has had some symotoms (lightheaded, dizzy, etc)   How fast would you like to see him? \"      Dr Tate Rivas responded that he would like to see him soon  I routed the result note to staff to have them relay this to patient  Apologize for any confusion.

## 2020-12-08 ENCOUNTER — TELEPHONE (OUTPATIENT)
Dept: FAMILY MEDICINE CLINIC | Age: 70
End: 2020-12-08

## 2020-12-11 ENCOUNTER — VIRTUAL VISIT (OUTPATIENT)
Dept: CARDIOLOGY CLINIC | Age: 70
End: 2020-12-11
Payer: MEDICAID

## 2020-12-11 PROBLEM — R00.2 PALPITATION: Status: ACTIVE | Noted: 2020-12-11

## 2020-12-11 PROBLEM — I47.29 NSVT (NONSUSTAINED VENTRICULAR TACHYCARDIA) (HCC): Status: ACTIVE | Noted: 2020-12-11

## 2020-12-11 PROBLEM — I49.3 PVC (PREMATURE VENTRICULAR CONTRACTION): Status: ACTIVE | Noted: 2020-12-11

## 2020-12-11 PROBLEM — R42 DIZZY: Status: ACTIVE | Noted: 2020-12-11

## 2020-12-11 PROCEDURE — 99204 OFFICE O/P NEW MOD 45 MIN: CPT | Performed by: INTERNAL MEDICINE

## 2020-12-11 ASSESSMENT — ENCOUNTER SYMPTOMS
NAUSEA: 0
BLOOD IN STOOL: 0
WHEEZING: 0
STRIDOR: 0
CHEST TIGHTNESS: 0
GASTROINTESTINAL NEGATIVE: 1
COUGH: 0
SHORTNESS OF BREATH: 1
EYES NEGATIVE: 1

## 2020-12-11 NOTE — PROGRESS NOTES
NEW PATIENT        Patient: Jami Pérez  YOB: 1950  MRN: [id number]    Chief Complaint:Palp dizzy   Chief Complaint   Patient presents with    Dizziness       CV Data:  2020 spect negative  2020 HM- extensive PVCs and 1 brief NSVT    Subjective/HPI   TELEHEALTH EVALUATION -- Audio/Visual (During UETTX-34 public health emergency)    pt has frequent palpitations associated with ill defiined sensation of not feeling well and SOB. He is also very frequently dizzy. No CP. No falls no bleed. He has many allergies to meds per pt. No  Prior CAD PAD CVA    Recent TSH WNL   Recent Mag WNL     EKG: SR    No past medical history on file. No past surgical history on file. No family history on file.     Social History     Socioeconomic History    Marital status:      Spouse name: Not on file    Number of children: Not on file    Years of education: Not on file    Highest education level: Not on file   Occupational History    Not on file   Social Needs    Financial resource strain: Not on file    Food insecurity     Worry: Never true     Inability: Never true    Transportation needs     Medical: No     Non-medical: No   Tobacco Use    Smoking status: Former Smoker     Packs/day: 2.00     Years: 27.00     Pack years: 54.00     Start date:      Last attempt to quit: 1986     Years since quittin.9    Smokeless tobacco: Never Used    Tobacco comment: quit 32 years ago   Substance and Sexual Activity    Alcohol use: Not Currently     Comment: occasionally    Drug use: Never    Sexual activity: Yes     Partners: Female   Lifestyle    Physical activity     Days per week: Not on file     Minutes per session: Not on file    Stress: Not on file   Relationships    Social connections     Talks on phone: Not on file     Gets together: Not on file     Attends Zoroastrianism service: Not on file     Active member of club or organization: Not on file     Attends meetings of clubs or organizations: Not on file     Relationship status: Not on file    Intimate partner violence     Fear of current or ex partner: Not on file     Emotionally abused: Not on file     Physically abused: Not on file     Forced sexual activity: Not on file   Other Topics Concern    Not on file   Social History Narrative    Not on file       Allergies   Allergen Reactions    Bactrim [Sulfamethoxazole-Trimethoprim]      Tremors and confusion     Ciprofloxacin      Tremors and confusion     Pepcid [Famotidine]        Current Outpatient Medications   Medication Sig Dispense Refill    metoprolol tartrate (LOPRESSOR) 25 MG tablet Take 1 tablet by mouth 2 times daily 60 tablet 3    omeprazole (PRILOSEC) 40 MG delayed release capsule Take 1 capsule by mouth every morning (before breakfast) 30 capsule 1    activated vegetable charcoal (CHARCOCAPS) 260 MG capsule Take 1 capsule 4 times a day as needed 120 capsule 3    Probiotic Acidophilus (FLORANEX) TABS Take 1 tablet by mouth 2 times daily 60 tablet 0    ZINC PO Take by mouth      Ascorbic Acid (OCTAVIO-C PO) Take by mouth      Omega-3 Fatty Acids (FISH OIL) 1000 MG CAPS Take 1,000 mg by mouth 3 times daily      Multiple Vitamins-Minerals (SUNVITE ACTIVE ADULT 50+ PO) Take by mouth      Coenzyme Q10 (COQ-10) 200 MG CAPS Take by mouth      aspirin 81 MG tablet Take 81 mg by mouth daily      Cholecalciferol (VITAMIN D3) 1000 UNITS TABS Take by mouth       No current facility-administered medications for this visit. Review of Systems:   Review of Systems   Constitutional: Negative. Negative for diaphoresis and fatigue. HENT: Negative. Eyes: Negative. Respiratory: Positive for shortness of breath. Negative for cough, chest tightness, wheezing and stridor. Cardiovascular: Positive for palpitations. Negative for chest pain and leg swelling. Gastrointestinal: Negative. Negative for blood in stool and nausea. Genitourinary: Negative. Component Value Date    TSH 0.999 10/27/2020             Patient Active Problem List   Diagnosis    Palpitation    Dizzy    NSVT (nonsustained ventricular tachycardia) (HCC)    PVC (premature ventricular contraction)       There are no discontinued medications. Modified Medications    No medications on file       Orders Placed This Encounter   Medications    metoprolol tartrate (LOPRESSOR) 25 MG tablet     Sig: Take 1 tablet by mouth 2 times daily     Dispense:  60 tablet     Refill:  3       Assessment/Plan:    1. Palpitation     - ECHO Complete 2D W Doppler W Color; Future    2. Dizzy     - US CAROTID ARTERY BILATERAL; Future    3. NSVT (nonsustained ventricular tachycardia) (HCC)  BB    4. PVC (premature ventricular contraction)       5. Essential hypertension  BB     RTO after tests. Counseling:  Heart Healthy Lifestyle, Low Salt Diet, Take Precautions to Prevent Falls and Walk Daily    Return for AFTER TESTS.     Electronically signed by Yelena Ely MD on 12/11/2020 at 4:06 PM

## 2020-12-14 ENCOUNTER — TELEPHONE (OUTPATIENT)
Dept: FAMILY MEDICINE CLINIC | Age: 70
End: 2020-12-14

## 2020-12-14 ENCOUNTER — TELEPHONE (OUTPATIENT)
Dept: CARDIOLOGY CLINIC | Age: 70
End: 2020-12-14

## 2020-12-14 NOTE — TELEPHONE ENCOUNTER
Patient calling. He wishes to postpone his testing until January or February 2021 due to the covid. He just doesn't want to go into the hospital for testing at this time.

## 2020-12-14 NOTE — TELEPHONE ENCOUNTER
Please let patient know we would like him to see a provider at [facility] urology   We are recommending a mri prostate there as well  Please make sure he has an appt

## 2020-12-14 NOTE — ADDENDUM NOTE
Addended by: Arnoldo Neal on: 12/14/2020 01:31 PM     Modules accepted: Orders Retention Suture Bite Size: 3 mm

## 2020-12-15 NOTE — TELEPHONE ENCOUNTER
Jennifer Peck move his f/u accordingly. If his symptoms of dizziness worsens he needs to complete test sooner.

## 2020-12-18 ENCOUNTER — TELEPHONE (OUTPATIENT)
Dept: FAMILY MEDICINE CLINIC | Age: 70
End: 2020-12-18

## 2020-12-18 ENCOUNTER — VIRTUAL VISIT (OUTPATIENT)
Dept: GASTROENTEROLOGY | Age: 70
End: 2020-12-18
Payer: MEDICAID

## 2020-12-18 PROCEDURE — 99211 OFF/OP EST MAY X REQ PHY/QHP: CPT | Performed by: NURSE PRACTITIONER

## 2020-12-18 NOTE — TELEPHONE ENCOUNTER
The referral for Charly to a second urologist at [facility] was faxed but they said they didn't get it. Can we send it to [phone]. This doctor is in [place] not in [facility] [employer]. And this is the fax # they gave him.

## 2020-12-18 NOTE — PROGRESS NOTES
2020    TELEHEALTH EVALUATION -- Audio/Visual (During - public health emergency)    Due to COVID 19 outbreak, patient's office visit was converted to a virtual visit. Patient was contacted and agreed to proceed with a virtual visit via [employer]. me 15 minutes. The risks and benefits of converting to a virtual visit were discussed in light of the current infectious disease epidemic. Patient also understood that insurance coverage and co-pays are up to their individual insurance plans. Patient location Home  Provider location office  HPI:  Kartik Steen (:  1950) has requested an audio/video evaluation for the following concern(s):  Patient last seen by Dr. Anupama Ramon on 2020 with complaints of excessive belching, burping and flatulence. Patient underwent 6 weeks of antibiotic therapy. Suspect alteration in microbiota secondary to prolonged antibiotic use. Recommended activated charcoal and probiotics. Patient reports slight improvement of symptoms with probiotics and Phazyme. Suboptimal response to activated charcoal.  Abdominal pain has improved significantly, but will still experience this on occasion. Passing flatus and belching will improve pain as well. No GERD symptoms. Dysphagia, weight loss or loss of appetite. Patient has significantly increased his dietary fiber intake. He also is taking 1 tablespoon of Metamucil per day with prune juice. Reports experiencing constipation due to recent prostatitis and antibiotic use. This regimen has helped him significantly. Now having 1-2 large formed bowel movements per day. He feels fully evacuated. Patient reports his symptoms of excessive belching, bloating, abdominal pain started after increasing dietary fiber and fiber supplementation. No chest pain, shortness of breath, or palpitations. He denies melena, hematochezia, or hematemesis. At this time he is not keen on undergoing any endoscopic investigations. Review of Systems   All other systems reviewed and are negative. Prior to Visit Medications    Medication Sig Taking?  Authorizing Provider   metoprolol tartrate (LOPRESSOR) 25 MG tablet Take 1 tablet by mouth 2 times daily Yes Negro Wilson MD   omeprazole (PRILOSEC) 40 MG delayed release capsule Take 1 capsule by mouth every morning (before breakfast) Yes Demetrio Zavala MD   activated vegetable charcoal (CHARCOCAPS) 260 MG capsule Take 1 capsule 4 times a day as needed Yes Fern Bauer MD   Probiotic Acidophilus [facility]) TABS Take 1 tablet by mouth 2 times daily Yes Fern Bauer MD   ZINC PO Take by mouth Yes Historical Provider, MD   Ascorbic Acid (OCTAVIO-C PO) Take by mouth Yes Historical Provider, MD   Omega-3 Fatty Acids (FISH OIL) 1000 MG CAPS Take 1,000 mg by mouth 3 times daily Yes Historical Provider, MD   Multiple Vitamins-Minerals ([address] 50+ PO) Take by mouth Yes Historical Provider, MD   Coenzyme Q10 (COQ-10) 200 MG CAPS Take by mouth Yes Historical Provider, MD   aspirin 81 MG tablet Take 81 mg by mouth daily Yes Historical Provider, MD   Cholecalciferol (VITAMIN D3) 1000 UNITS TABS Take by mouth Yes Historical Provider, MD       Social History     Tobacco Use    Smoking status: Former Smoker     Packs/day: 2.00     Years: 27.00     Pack years: 54.00     Start date:      Quit date: 1986     Years since quittin.9    Smokeless tobacco: Never Used    Tobacco comment: quit 32 years ago   Substance Use Topics    Alcohol use: Not Currently     Comment: occasionally    Drug use: Never        Allergies   Allergen Reactions    Bactrim [Sulfamethoxazole-Trimethoprim]      Tremors and confusion     Ciprofloxacin      Tremors and confusion     Pepcid [Famotidine]    , No past medical history on file., No past surgical history on file.,   Social History     Tobacco Use    Smoking status: Former Smoker     Packs/day: 2.00     Years: 27.00     Pack years: 54.00 Start date: 64     Quit date: 1986     Years since quittin.9    Smokeless tobacco: Never Used    Tobacco comment: quit 32 years ago   Substance Use Topics    Alcohol use: Not Currently     Comment: occasionally    Drug use: Never   , No family history on file. PHYSICAL EXAMINATION:  [x] Alert  [x] Oriented to person/place/time    [x] No apparent distress  [] Toxic appearing    [] Face flushed appearing [] Sclera clear  [] Lips are cyanotic      [x] Breathing appears normal  [] Appears tachypneic      [] Rash on visible skin    [] Cranial Nerves II-XII grossly intact    [] Motor grossly intact in visible upper extremities    [] Motor grossly intact in visible lower extremities    [x] Normal Mood  [] Anxious appearing    [] Depressed appearing  [] Confused appearing      [] Poor short term memory  [] Poor long term memory    [] OTHER:      Due to this being a TeleHealth encounter, evaluation of the following organ systems is limited: Vitals/Constitutional/EENT/Resp/CV/GI//MS/Neuro/Skin/Heme-Lymph-Imm. ASSESSMENT/PLAN:  1. 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 Symptoms have significantly improved with probiotics and Phazyme, but still experiencing excessive belching, especially at night. No alarming symptoms such as weight loss or loss of appetite. Has also increased fiber supplementation and dietary fiber. Symptoms likely due to excessive fiber intake. -Decrease Metamucil to 1 teaspoon/day  -Add MiraLAX 17 g daily  -Reevaluate symptoms in the next 2 weeks. If symptoms persist recommend upper and lower endoscopic investigations. Return in about 2 weeks (around 2021). An  electronic signature was used to authenticate this note.     --GARY Long - CNP on 2020 at 10:28 AM Pursuant to the emergency declaration under the [facility] waiver authority and the [employer] and [employer] Act, this Virtual  Visit was conducted, with patient's consent, to reduce the patient's risk of exposure to COVID-19 and provide continuity of care for an established patient. Services were provided through a video synchronous discussion virtually to substitute for in-person clinic visit.

## 2020-12-21 NOTE — TELEPHONE ENCOUNTER
Just talked to [facility] and they do have the referral and the patient is scheduled on 1-7-21 in [facility].

## 2020-12-22 ENCOUNTER — TELEPHONE (OUTPATIENT)
Dept: FAMILY MEDICINE CLINIC | Age: 70
End: 2020-12-22

## 2020-12-22 NOTE — TELEPHONE ENCOUNTER
Given the complexity of his care please call him  Let's try to avoid my chart  Call with any urgent issues and ask him what else he would like us to do before his next appt  Thank you

## 2020-12-23 ENCOUNTER — HOSPITAL ENCOUNTER (OUTPATIENT)
Dept: ULTRASOUND IMAGING | Age: 70
Discharge: HOME | End: 2020-12-25
Payer: MEDICAID

## 2020-12-23 ENCOUNTER — HOSPITAL ENCOUNTER (OUTPATIENT)
Dept: NON INVASIVE DIAGNOSTICS | Age: 70
Discharge: HOME | End: 2020-12-23

## 2020-12-23 LAB
LV EF: 40 %
LVEF MODALITY: NORMAL

## 2020-12-23 PROCEDURE — 93306 TTE W/DOPPLER COMPLETE: CPT

## 2020-12-23 PROCEDURE — 93880 EXTRACRANIAL BILAT STUDY: CPT

## 2020-12-23 PROCEDURE — 93880 EXTRACRANIAL BILAT STUDY: CPT | Performed by: INTERNAL MEDICINE

## 2020-12-30 ENCOUNTER — VIRTUAL VISIT (OUTPATIENT)
Dept: CARDIOLOGY CLINIC | Age: 70
End: 2020-12-30
Payer: MEDICAID

## 2020-12-30 PROCEDURE — 99214 OFFICE O/P EST MOD 30 MIN: CPT | Performed by: INTERNAL MEDICINE

## 2020-12-30 RX ORDER — LISINOPRIL 5 MG/1
5 TABLET ORAL NIGHTLY
Qty: 90 TABLET | Refills: 3 | Status: SHIPPED | OUTPATIENT
Start: 2020-12-30 | End: 2021-03-16

## 2020-12-30 ASSESSMENT — ENCOUNTER SYMPTOMS
NAUSEA: 0
EYES NEGATIVE: 1
BLOOD IN STOOL: 0
SHORTNESS OF BREATH: 1
WHEEZING: 0
STRIDOR: 0
CHEST TIGHTNESS: 0
GASTROINTESTINAL NEGATIVE: 1
COUGH: 0

## 2020-12-30 NOTE — PROGRESS NOTES
Office Visit        Patient: Jami Pérez  YOB: 1950  MRN: [id number]    Chief Complaint:Palp dizzy   Chief Complaint   Patient presents with    Cardiomyopathy       CV Data:  2020 spect negative  2020 HM- extensive PVCs and 1 brief NSVT   CUS LICA 28-   Echo EF 40      Subjective/HPI   TELEHEALTH EVALUATION -- Audio/Visual (During FOUDT-58 public health emergency)    pt has frequent palpitations associated with ill defiined sensation of not feeling well and SOB. He is also very frequently dizzy. No CP. No falls no bleed. He has many allergies to meds per pt. No  Prior CAD PAD CVA    2020 TELEHEALTH EVALUATION -- Audio/Visual (During [facility] public health emergency)    No cp no sob no dizzy no falls no bleed. Recent TSH WNL   Recent Mag WNL     EKG: SR    No past medical history on file. No past surgical history on file. No family history on file.     Social History     Socioeconomic History    Marital status:      Spouse name: Not on file    Number of children: Not on file    Years of education: Not on file    Highest education level: Not on file   Occupational History    Not on file   Social Needs    Financial resource strain: Not on file    Food insecurity     Worry: Never true     Inability: Never true    Transportation needs     Medical: No     Non-medical: No   Tobacco Use    Smoking status: Former Smoker     Packs/day: 2.00     Years: 27.00     Pack years: 54.00     Start date:      Quit date: 1986     Years since quittin.0    Smokeless tobacco: Never Used    Tobacco comment: quit 32 years ago   Substance and Sexual Activity    Alcohol use: Not Currently     Comment: occasionally    Drug use: Never    Sexual activity: Yes     Partners: Female   Lifestyle    Physical activity     Days per week: Not on file     Minutes per session: Not on file    Stress: Not on file   Relationships    Social connections Talks on phone: Not on file     Gets together: Not on file     Attends Judaism service: Not on file     Active member of club or organization: Not on file     Attends meetings of clubs or organizations: Not on file     Relationship status: Not on file    Intimate partner violence     Fear of current or ex partner: Not on file     Emotionally abused: Not on file     Physically abused: Not on file     Forced sexual activity: Not on file   Other Topics Concern    Not on file   Social History Narrative    Not on file       Allergies   Allergen Reactions    Bactrim [Sulfamethoxazole-Trimethoprim]      Tremors and confusion     Ciprofloxacin      Tremors and confusion     Pepcid [Famotidine]        Current Outpatient Medications   Medication Sig Dispense Refill    metoprolol tartrate (LOPRESSOR) 25 MG tablet Take 1 tablet by mouth 2 times daily 60 tablet 3    omeprazole (PRILOSEC) 40 MG delayed release capsule Take 1 capsule by mouth every morning (before breakfast) 30 capsule 1    activated vegetable charcoal (CHARCOCAPS) 260 MG capsule Take 1 capsule 4 times a day as needed 120 capsule 3    ZINC PO Take by mouth      Ascorbic Acid (OCTAVIO-C PO) Take by mouth      Omega-3 Fatty Acids (FISH OIL) 1000 MG CAPS Take 1,000 mg by mouth 3 times daily      Multiple Vitamins-Minerals (SUNVITE ACTIVE ADULT 50+ PO) Take by mouth      Coenzyme Q10 (COQ-10) 200 MG CAPS Take by mouth      aspirin 81 MG tablet Take 81 mg by mouth daily      Cholecalciferol (VITAMIN D3) 1000 UNITS TABS Take by mouth       No current facility-administered medications for this visit. Review of Systems:   Review of Systems   Constitutional: Negative. Negative for diaphoresis and fatigue. HENT: Negative. Eyes: Negative. Respiratory: Positive for shortness of breath. Negative for cough, chest tightness, wheezing and stridor. Cardiovascular: Positive for palpitations. Negative for chest pain and leg swelling. Magnesium:    Lab Results   Component Value Date    MG 2.0 10/30/2020     TSH:  Lab Results   Component Value Date    TSH 0.999 10/27/2020             Patient Active Problem List   Diagnosis    Palpitation    Dizzy    NSVT (nonsustained ventricular tachycardia) (HCC)    PVC (premature ventricular contraction)       There are no discontinued medications. Modified Medications    No medications on file       No orders of the defined types were placed in this encounter. Assessment/Plan:    1. Palpitation     - ECHO Complete 2D W Doppler W Color; Future EF 40% - add ACEI QHS    2. Dizzy     - US CAROTID ARTERY BILATERAL; Future - LICA     3. NSVT (nonsustained ventricular tachycardia) (HCC)  BB    4. PVC (premature ventricular contraction)       5. Essential hypertension  BB     6. PSA elevated - f/u Urololgy    Counseling:  Heart Healthy Lifestyle, Low Salt Diet, Take Precautions to Prevent Falls and Walk Daily    Return in about 2 months (around 2/28/2021). Electronically signed by Everardo Bond MD on 12/30/2020 at 3:16 PM  Pursuant to the emergency declaration under the [facility] waiver authority and the [employer] and [employer] Act, this Virtual Visit was conducted, with patient's consent, to reduce the patient's risk of exposure to COVID-19 and provide continuity of care for an established patient. Services were provided through a video synchronous discussion virtually to substitute for in-person clinic visit. Visit completed using [employer]. me. Patient was located at home and I was located in the clinic.

## 2021-01-12 ENCOUNTER — TELEPHONE (OUTPATIENT)
Dept: FAMILY MEDICINE CLINIC | Age: 71
End: 2021-01-12

## 2021-01-12 RX ORDER — OMEPRAZOLE 40 MG/1
40 CAPSULE, DELAYED RELEASE ORAL 2 TIMES DAILY
Qty: 60 CAPSULE | Refills: 0 | Status: SHIPPED | OUTPATIENT
Start: 2021-01-12 | End: 2021-04-02 | Stop reason: SDUPTHER

## 2021-01-20 ENCOUNTER — VIRTUAL VISIT (OUTPATIENT)
Dept: GASTROENTEROLOGY | Age: 71
End: 2021-01-20

## 2021-01-20 DIAGNOSIS — R14.2 BURPING: Primary | ICD-10-CM

## 2021-01-20 PROCEDURE — 99442 PR PHYS/QHP TELEPHONE EVALUATION 11-20 MIN: CPT | Performed by: INTERNAL MEDICINE

## 2021-01-20 NOTE — PROGRESS NOTES
2021    TELEHEALTH EVALUATION -- Audio/Visual (During WHXIM-69 public health emergency)    Due to COVID 19 outbreak, patient's office visit was converted to a virtual visit. Patient was contacted and agreed to proceed with a virtual visit via Telephone Visit  The risks and benefits of converting to a virtual visit were discussed in light of the current infectious disease epidemic. Patient also understood that insurance coverage and co-pays are up to their individual insurance plans. Chief Complaint   Patient presents with    Follow-up     Patient is a phone visit. Call [phone]. Still belching. HPI:  Patient Location: Home  Provider location: Office    Penny Chisholm (:  1950) following up after last visit in December, patient continues to have symptoms of excessive burping and belching. The symptoms are mainly in the morning, symptoms subside by 9 AM.  Patient reports taking Prilosec, Metamucil, MiraLAX and prune juice early in the morning. He denies any constipation. Has bowel movements on a daily basis. Denies any weight gain or weight loss  His diet is rich in fiber, eats a lot of vegetables and fruits. He has tried activated charcoal, Gas-X Phazyme without benefit    Previous GI work up/Endoscopic investigations:   No previous colonoscopy -patient not keen to undergo colonoscopy due to concerns with prep    Review of Systems   All other systems reviewed and are negative. Prior to Visit Medications    Medication Sig Taking?  Authorizing Provider   omeprazole (PRILOSEC) 40 MG delayed release capsule Take 1 capsule by mouth 2 times daily Yes Ethel Molina MD   lisinopril (PRINIVIL;ZESTRIL) 5 MG tablet Take 1 tablet by mouth nightly Yes Rachel Reaves MD   metoprolol tartrate (LOPRESSOR) 25 MG tablet Take 1 tablet by mouth 2 times daily Yes Rachel Reaves MD   activated vegetable charcoal (CHARCOCAPS) 260 MG capsule Take 1 capsule 4 times a day as needed Yes Samantha Gonzales MD ZINC PO Take by mouth Yes Historical Provider, MD   Ascorbic Acid (OCTAVIO-C PO) Take by mouth Yes Historical Provider, MD   Omega-3 Fatty Acids (FISH OIL) 1000 MG CAPS Take 1,000 mg by mouth 3 times daily Yes Historical Provider, MD   Multiple Vitamins-Minerals ([address] 50+ PO) Take by mouth Yes Historical Provider, MD   Coenzyme Q10 (COQ-10) 200 MG CAPS Take by mouth Yes Historical Provider, MD   aspirin 81 MG tablet Take 81 mg by mouth daily Yes Historical Provider, MD   Cholecalciferol (VITAMIN D3) 1000 UNITS TABS Take by mouth Yes Historical Provider, MD       Social History     Tobacco Use    Smoking status: Former Smoker     Packs/day: 2.00     Years: 27.00     Pack years: 54.00     Start date:      Quit date: 1986     Years since quittin.0    Smokeless tobacco: Never Used    Tobacco comment: quit 32 years ago   Substance Use Topics    Alcohol use: Not Currently     Comment: occasionally    Drug use: Never      PMH, PSH, FH and allergies reviewed and updated    Limited information on physical examination: Due to this being a telehealth visit, physical examination could not be performed    Laboratory, Pathology, Radiology reviewed indetail with relevant important investigations summarized below:  Lab Results   Component Value Date    WBC 6.7 10/30/2020    HGB 15.7 10/30/2020    HCT 46.1 10/30/2020    MCV 94.5 10/30/2020     10/30/2020     Lab Results   Component Value Date    ALT 20 10/30/2020    AST 18 10/30/2020    ALKPHOS 56 10/30/2020    BILITOT 0.6 10/30/2020      Assessment and Plan:  69 y.o. male with excessive belching and burping, medical intervention thus far has been not helpful. Patient has tried activated charcoal, Phazyme and Gas-X without benefit. PPI therapy has been unhelpful.   Etiology likely both functional in nature and related to his diet  -Dietary changes discussed, patient will decrease fiber intake  -Continue PPI therapy -Lifestyle modification, stress reduction discussed    Return in about 8 weeks (around 3/17/2021). This visit was completed over telephone, with patient at their home and provider at the hospital, total time spent conversing with the patient 18 minutes, other personnel involved in the care are  staff and Medical assistant. Kristal Villarreal MD   Gastroenterology  [facility]    Pursuant to the emergency declaration under the [facility] [address]24 waiver authority and the Coronavirus Preparedness and [employer] Act, this Virtual Visit was conducted, with patient's consent, to reduce the patient's risk of exposure to COVID-19 and provide continuity of care for an established patient. Please note this report has been partially produced using speech recognition software and may cause contain errors related to thatsystem including grammar, punctuation and spelling as well as words and phrases that may seem inappropriate. If there are questions or concerns please feel free to contact me to clarify.

## 2021-03-16 ENCOUNTER — OFFICE VISIT (OUTPATIENT)
Dept: GASTROENTEROLOGY | Age: 71
End: 2021-03-16

## 2021-03-16 VITALS
DIASTOLIC BLOOD PRESSURE: 66 MMHG | SYSTOLIC BLOOD PRESSURE: 122 MMHG | WEIGHT: 227 LBS | OXYGEN SATURATION: 98 % | HEIGHT: 78 IN | HEART RATE: 74 BPM | BODY MASS INDEX: 26.27 KG/M2

## 2021-03-16 DIAGNOSIS — R10.10 UPPER ABDOMINAL PAIN: ICD-10-CM

## 2021-03-16 DIAGNOSIS — R10.30 LOWER ABDOMINAL PAIN: Primary | ICD-10-CM

## 2021-03-16 DIAGNOSIS — Z01.818 PREOP TESTING: Primary | ICD-10-CM

## 2021-03-16 DIAGNOSIS — R14.2 BURPING: ICD-10-CM

## 2021-03-16 PROCEDURE — 99214 OFFICE O/P EST MOD 30 MIN: CPT | Performed by: INTERNAL MEDICINE

## 2021-03-16 PROCEDURE — 4040F PNEUMOC VAC/ADMIN/RCVD: CPT | Performed by: INTERNAL MEDICINE

## 2021-03-16 PROCEDURE — 3017F COLORECTAL CA SCREEN DOC REV: CPT | Performed by: INTERNAL MEDICINE

## 2021-03-16 PROCEDURE — G8484 FLU IMMUNIZE NO ADMIN: HCPCS | Performed by: INTERNAL MEDICINE

## 2021-03-16 PROCEDURE — 1036F TOBACCO NON-USER: CPT | Performed by: INTERNAL MEDICINE

## 2021-03-16 PROCEDURE — G8420 CALC BMI NORM PARAMETERS: HCPCS | Performed by: INTERNAL MEDICINE

## 2021-03-16 PROCEDURE — 1123F ACP DISCUSS/DSCN MKR DOCD: CPT | Performed by: INTERNAL MEDICINE

## 2021-03-16 PROCEDURE — G8427 DOCREV CUR MEDS BY ELIG CLIN: HCPCS | Performed by: INTERNAL MEDICINE

## 2021-03-16 RX ORDER — SODIUM, POTASSIUM,MAG SULFATES 17.5-3.13G
SOLUTION, RECONSTITUTED, ORAL ORAL
Qty: 1 BOTTLE | Refills: 0 | Status: SHIPPED | OUTPATIENT
Start: 2021-03-16

## 2021-03-16 RX ORDER — HYOSCYAMINE SULFATE 0.12 MG/1
125 TABLET SUBLINGUAL EVERY 4 HOURS PRN
Qty: 120 EACH | Refills: 3 | Status: SHIPPED | OUTPATIENT
Start: 2021-03-16

## 2021-03-16 NOTE — PROGRESS NOTES
relevantimportant investigations summarized below:    No results for input(s): WBC, HGB, HCT, MCV, PLT in the last 720 hours. Lab Results   Component Value Date    ALT 20 10/30/2020    AST 18 10/30/2020    ALKPHOS 56 10/30/2020    BILITOT 0.6 10/30/2020     CT abdomen: 10/19/2020: Diverticulosis of the sigmoid colon and descending colon    Assessment and Plan:  Giselle Bueno 69 y.o. male for follow up. Additional complaint of lower abdominal pain and generalized weakness. 1. Lower abdominal pain  - Colonoscopy indicated both for screening purposes and to evaluate lower abdominal pain, procedure was discussed at length, including the risks and benefits. Split prep was prescribed and discussed. Importance of the prep in ensuring a good exam was emphasized. -Suprep  -Trial  Hyoscyamine Sulfate SL (LEVSIN/SL) 0.125 MG SUBL; Place 125 mcg under the tongue every 4 hours as needed (pain)  Dispense: 120 each; Refill: 3    2. Upper abdominal pain  3. Burping -functional in nature  - EGD to exclude any organic pathology    Return in about 2 months (around 5/16/2021). Precious Pulido MD   StaffGastroenterologist  [facility]    Please note this report has been partially produced using speech recognitionsoftware  and may cause contain errors related to that system including grammar, punctuation and spelling as well as words andphrases that may seem inappropriate. If there are questions or concerns please feel free to contact me to clarify.

## 2021-04-02 RX ORDER — OMEPRAZOLE 40 MG/1
40 CAPSULE, DELAYED RELEASE ORAL 2 TIMES DAILY
Qty: 60 CAPSULE | Refills: 0 | Status: SHIPPED | OUTPATIENT
Start: 2021-04-02 | End: 2021-05-02

## 2021-04-02 RX ORDER — OMEPRAZOLE 40 MG/1
40 CAPSULE, DELAYED RELEASE ORAL 2 TIMES DAILY
Qty: 60 CAPSULE | Refills: 0 | Status: SHIPPED | OUTPATIENT
Start: 2021-04-02 | End: 2021-04-02 | Stop reason: SDUPTHER

## 2023-04-04 ENCOUNTER — HOSPITAL ENCOUNTER (EMERGENCY)
Age: 73
Discharge: HOME | End: 2023-04-04
Payer: MEDICARE

## 2023-04-04 ENCOUNTER — APPOINTMENT (OUTPATIENT)
Dept: CT IMAGING | Age: 73
End: 2023-04-04
Payer: MEDICARE

## 2023-04-04 ENCOUNTER — APPOINTMENT (OUTPATIENT)
Dept: GENERAL RADIOLOGY | Age: 73
End: 2023-04-04
Payer: MEDICARE

## 2023-04-04 VITALS
SYSTOLIC BLOOD PRESSURE: 130 MMHG | DIASTOLIC BLOOD PRESSURE: 92 MMHG | WEIGHT: 220 LBS | BODY MASS INDEX: 25.45 KG/M2 | RESPIRATION RATE: 15 BRPM | OXYGEN SATURATION: 99 % | HEIGHT: 78 IN | TEMPERATURE: 97.1 F | HEART RATE: 88 BPM

## 2023-04-04 DIAGNOSIS — U07.1 COVID-19: Primary | ICD-10-CM

## 2023-04-04 LAB
ALBUMIN SERPL-MCNC: 4.1 G/DL (ref 3.5–4.6)
ALP SERPL-CCNC: 48 U/L (ref 35–104)
ALT SERPL-CCNC: 20 U/L (ref 0–41)
ANION GAP SERPL CALCULATED.3IONS-SCNC: 12 MEQ/L (ref 9–15)
AST SERPL-CCNC: 33 U/L (ref 0–40)
BASOPHILS # BLD: 0 K/UL (ref 0–0.2)
BASOPHILS NFR BLD: 0.5 %
BILIRUB SERPL-MCNC: 0.3 MG/DL (ref 0.2–0.7)
BNP BLD-MCNC: 71 PG/ML
BUN SERPL-MCNC: 19 MG/DL (ref 8–23)
CALCIUM SERPL-MCNC: 9.5 MG/DL (ref 8.5–9.9)
CHLORIDE SERPL-SCNC: 102 MEQ/L (ref 95–107)
CO2 SERPL-SCNC: 27 MEQ/L (ref 20–31)
CREAT SERPL-MCNC: 0.82 MG/DL (ref 0.7–1.2)
D DIMER PPP FEU-MCNC: 0.56 MG/L FEU (ref 0–0.5)
EKG ATRIAL RATE: 88 BPM
EKG P-R INTERVAL: 160 MS
EKG Q-T INTERVAL: 358 MS
EKG QRS DURATION: 100 MS
EKG QTC CALCULATION (BAZETT): 433 MS
EKG R AXIS: -24 DEGREES
EKG T AXIS: 149 DEGREES
EKG VENTRICULAR RATE: 88 BPM
EOSINOPHIL # BLD: 0 K/UL (ref 0–0.7)
EOSINOPHIL NFR BLD: 0.2 %
ERYTHROCYTE [DISTWIDTH] IN BLOOD BY AUTOMATED COUNT: 13.9 % (ref 11.5–14.5)
GLOBULIN SER CALC-MCNC: 2.9 G/DL (ref 2.3–3.5)
GLUCOSE SERPL-MCNC: 106 MG/DL (ref 70–99)
HCT VFR BLD AUTO: 45.8 % (ref 42–52)
HGB BLD-MCNC: 15.8 G/DL (ref 14–18)
INFLUENZA A BY PCR: NEGATIVE
INFLUENZA B BY PCR: NEGATIVE
LYMPHOCYTES # BLD: 1.4 K/UL (ref 1–4.8)
LYMPHOCYTES NFR BLD: 32.7 %
MCH RBC QN AUTO: 32.3 PG (ref 27–31.3)
MCHC RBC AUTO-ENTMCNC: 34.4 % (ref 33–37)
MCV RBC AUTO: 94 FL (ref 79–92.2)
MONOCYTES # BLD: 0.6 K/UL (ref 0.2–0.8)
MONOCYTES NFR BLD: 13.9 %
NEUTROPHILS # BLD: 2.3 K/UL (ref 1.4–6.5)
NEUTS SEG NFR BLD: 52.7 %
PLATELET # BLD AUTO: 150 K/UL (ref 130–400)
POTASSIUM SERPL-SCNC: 4.4 MEQ/L (ref 3.4–4.9)
PROT SERPL-MCNC: 7 G/DL (ref 6.3–8)
RBC # BLD AUTO: 4.88 M/UL (ref 4.7–6.1)
SARS-COV-2 RDRP RESP QL NAA+PROBE: DETECTED
SODIUM SERPL-SCNC: 141 MEQ/L (ref 135–144)
STREP GRP A PCR: NEGATIVE
TROPONIN T SERPL-MCNC: <0.01 NG/ML (ref 0–0.01)
WBC # BLD AUTO: 4.4 K/UL (ref 4.8–10.8)

## 2023-04-04 PROCEDURE — 87502 INFLUENZA DNA AMP PROBE: CPT

## 2023-04-04 PROCEDURE — 93010 ELECTROCARDIOGRAM REPORT: CPT | Performed by: INTERNAL MEDICINE

## 2023-04-04 PROCEDURE — 71045 X-RAY EXAM CHEST 1 VIEW: CPT

## 2023-04-04 PROCEDURE — 36415 COLL VENOUS BLD VENIPUNCTURE: CPT

## 2023-04-04 PROCEDURE — 85379 FIBRIN DEGRADATION QUANT: CPT

## 2023-04-04 PROCEDURE — 87651 STREP A DNA AMP PROBE: CPT

## 2023-04-04 PROCEDURE — 85025 COMPLETE CBC W/AUTO DIFF WBC: CPT

## 2023-04-04 PROCEDURE — 83880 ASSAY OF NATRIURETIC PEPTIDE: CPT

## 2023-04-04 PROCEDURE — 87635 SARS-COV-2 COVID-19 AMP PRB: CPT

## 2023-04-04 PROCEDURE — 93005 ELECTROCARDIOGRAM TRACING: CPT | Performed by: STUDENT IN AN ORGANIZED HEALTH CARE EDUCATION/TRAINING PROGRAM

## 2023-04-04 PROCEDURE — 2580000003 HC RX 258: Performed by: STUDENT IN AN ORGANIZED HEALTH CARE EDUCATION/TRAINING PROGRAM

## 2023-04-04 PROCEDURE — 84484 ASSAY OF TROPONIN QUANT: CPT

## 2023-04-04 PROCEDURE — 80053 COMPREHEN METABOLIC PANEL: CPT

## 2023-04-04 RX ORDER — AMOXICILLIN AND CLAVULANATE POTASSIUM 875; 125 MG/1; MG/1
1 TABLET, FILM COATED ORAL 2 TIMES DAILY
Qty: 20 TABLET | Refills: 0 | Status: SHIPPED | OUTPATIENT
Start: 2023-04-04 | End: 2023-04-14

## 2023-04-04 RX ORDER — 0.9 % SODIUM CHLORIDE 0.9 %
500 INTRAVENOUS SOLUTION INTRAVENOUS ONCE
Status: COMPLETED | OUTPATIENT
Start: 2023-04-04 | End: 2023-04-04

## 2023-04-04 RX ADMIN — SODIUM CHLORIDE 500 ML: 9 INJECTION, SOLUTION INTRAVENOUS at 07:06

## 2023-04-04 ASSESSMENT — ENCOUNTER SYMPTOMS
CONSTIPATION: 0
ABDOMINAL DISTENTION: 0
SINUS PAIN: 0
TROUBLE SWALLOWING: 0
DIARRHEA: 1
VOICE CHANGE: 0
NAUSEA: 0
BLOOD IN STOOL: 0
COUGH: 1
SORE THROAT: 1
RHINORRHEA: 0
PHOTOPHOBIA: 0
SHORTNESS OF BREATH: 1
ABDOMINAL PAIN: 0
VOMITING: 0
WHEEZING: 0

## 2023-04-04 ASSESSMENT — PAIN DESCRIPTION - PAIN TYPE: TYPE: ACUTE PAIN

## 2023-04-04 ASSESSMENT — PAIN DESCRIPTION - LOCATION: LOCATION: THROAT

## 2023-04-04 ASSESSMENT — LIFESTYLE VARIABLES: HOW OFTEN DO YOU HAVE A DRINK CONTAINING ALCOHOL: NEVER

## 2023-04-04 ASSESSMENT — PAIN SCALES - GENERAL: PAINLEVEL_OUTOF10: 7

## 2023-04-04 ASSESSMENT — PAIN - FUNCTIONAL ASSESSMENT: PAIN_FUNCTIONAL_ASSESSMENT: 0-10

## 2023-04-04 NOTE — ED NOTES
Pt out in waiting room waiting for ride home.  Verbalizes understanding of DC instructions      Katey Nova, RN  04/04/23 [id number]

## 2023-04-04 NOTE — ED PROVIDER NOTES
1. COVID-19          DISPOSITION/PLAN   DISPOSITION Decision To Discharge 04/04/2023 08:20:35 AM      PATIENT REFERRED TO:  Jim Salas, APRN - CNP  [address] [facility]  [phone]    Schedule an appointment as soon as possible for a visit in 2 days      [facility]) ED  [address]  [phone]  Go to   As needed, If symptoms worsen      DISCHARGE MEDICATIONS:  New Prescriptions    AMOXICILLIN-CLAVULANATE (AUGMENTIN) 875-125 MG PER TABLET    Take 1 tablet by mouth 2 times daily for 10 days     Controlled Substances Monitoring:     No flowsheet data found.     (Please note that portions of this note were completed with a voice recognition program.  Efforts were made to edit the dictations but occasionally words are mis-transcribed.)    Robles Melton PA-C (electronically signed)             Robles Melton PA-C  04/04/23 [id number]

## 2023-04-04 NOTE — ED TRIAGE NOTES
Pt c/o covid symptoms and possible strep throat for past 3 days. Pt reports cough, sore throat, weakness and fever. Pt states his home covid test was positive. Pt states his wife tested positive for covid and strep last week. Pt is a/o x 4 calm, skin p/w/d resp even and non-labored. Voice is hoarse. No distress noted.

## 2023-04-13 PROBLEM — J02.0 ACUTE STREPTOCOCCAL PHARYNGITIS: Status: ACTIVE | Noted: 2023-04-13

## 2023-04-13 PROBLEM — U07.1 COVID-19: Status: ACTIVE | Noted: 2023-04-13

## 2023-04-17 ENCOUNTER — TELEPHONE (OUTPATIENT)
Dept: FAMILY MEDICINE CLINIC | Age: 73
End: 2023-04-17

## 2023-04-17 NOTE — TELEPHONE ENCOUNTER
I spoke to the patient and he said that he is feeling a little better and will contact his provider if he gets worse. He also said that he already had a chest x-ray 04/04/23 and does not want another one nor the CT scan. Thank you.

## 2023-04-17 NOTE — TELEPHONE ENCOUNTER
Patient called and was given bw results. He seemed very pleased with the results. Patient said he has very low energy, sob when he walks short distances and a cough. Thank you.

## 2023-05-11 ENCOUNTER — OFFICE VISIT (OUTPATIENT)
Dept: FAMILY MEDICINE CLINIC | Age: 73
End: 2023-05-11

## 2023-05-11 VITALS
TEMPERATURE: 96.9 F | BODY MASS INDEX: 25.45 KG/M2 | DIASTOLIC BLOOD PRESSURE: 74 MMHG | HEIGHT: 78 IN | HEART RATE: 63 BPM | SYSTOLIC BLOOD PRESSURE: 102 MMHG | WEIGHT: 220 LBS | OXYGEN SATURATION: 96 %

## 2023-05-11 DIAGNOSIS — M54.6 ACUTE MIDLINE THORACIC BACK PAIN: ICD-10-CM

## 2023-05-11 DIAGNOSIS — Z91.81 HISTORY OF FALL: Primary | ICD-10-CM

## 2023-05-11 DIAGNOSIS — B02.9 HERPES ZOSTER WITHOUT COMPLICATION: ICD-10-CM

## 2023-05-11 DIAGNOSIS — M25.512 ACUTE PAIN OF LEFT SHOULDER: ICD-10-CM

## 2023-05-11 DIAGNOSIS — R07.81 RIB PAIN ON LEFT SIDE: ICD-10-CM

## 2023-05-11 PROBLEM — I50.22 CHRONIC SYSTOLIC (CONGESTIVE) HEART FAILURE (HCC): Status: ACTIVE | Noted: 2023-05-11

## 2023-05-11 PROCEDURE — 99214 OFFICE O/P EST MOD 30 MIN: CPT | Performed by: FAMILY MEDICINE

## 2023-05-11 PROCEDURE — 1123F ACP DISCUSS/DSCN MKR DOCD: CPT | Performed by: FAMILY MEDICINE

## 2023-05-11 RX ORDER — NAPROXEN 500 MG/1
500 TABLET ORAL EVERY 12 HOURS PRN
Qty: 30 TABLET | Refills: 0 | Status: SHIPPED | OUTPATIENT
Start: 2023-05-11

## 2023-05-11 RX ORDER — LIDOCAINE 50 MG/G
1 PATCH TOPICAL DAILY
Qty: 10 PATCH | Refills: 0 | Status: SHIPPED | OUTPATIENT
Start: 2023-05-11 | End: 2023-05-21

## 2023-05-11 RX ORDER — VALACYCLOVIR HYDROCHLORIDE 1 G/1
1000 TABLET, FILM COATED ORAL 3 TIMES DAILY
Qty: 21 TABLET | Refills: 0 | Status: SHIPPED | OUTPATIENT
Start: 2023-05-11 | End: 2023-05-18

## 2023-05-22 ENCOUNTER — TELEPHONE (OUTPATIENT)
Dept: FAMILY MEDICINE CLINIC | Age: 73
End: 2023-05-22

## 2023-05-22 ENCOUNTER — PATIENT MESSAGE (OUTPATIENT)
Dept: FAMILY MEDICINE CLINIC | Age: 73
End: 2023-05-22

## 2023-05-22 ENCOUNTER — OFFICE VISIT (OUTPATIENT)
Dept: FAMILY MEDICINE CLINIC | Age: 73
End: 2023-05-22

## 2023-05-22 VITALS
TEMPERATURE: 97.7 F | WEIGHT: 218 LBS | HEART RATE: 93 BPM | OXYGEN SATURATION: 98 % | SYSTOLIC BLOOD PRESSURE: 140 MMHG | DIASTOLIC BLOOD PRESSURE: 78 MMHG | BODY MASS INDEX: 25.22 KG/M2 | HEIGHT: 78 IN

## 2023-05-22 DIAGNOSIS — R07.81 RIB PAIN ON LEFT SIDE: Primary | ICD-10-CM

## 2023-05-22 PROCEDURE — 99213 OFFICE O/P EST LOW 20 MIN: CPT | Performed by: NURSE PRACTITIONER

## 2023-05-22 PROCEDURE — 1123F ACP DISCUSS/DSCN MKR DOCD: CPT | Performed by: NURSE PRACTITIONER

## 2023-05-22 RX ORDER — METHYLPREDNISOLONE 4 MG/1
TABLET ORAL
Qty: 1 KIT | Refills: 0 | Status: SHIPPED | OUTPATIENT
Start: 2023-05-22 | End: 2023-05-28

## 2023-05-22 RX ORDER — CYCLOBENZAPRINE HCL 5 MG
5 TABLET ORAL 2 TIMES DAILY PRN
Qty: 10 TABLET | Refills: 0 | Status: SHIPPED | OUTPATIENT
Start: 2023-05-22 | End: 2023-06-01

## 2023-05-22 ASSESSMENT — ENCOUNTER SYMPTOMS
SHORTNESS OF BREATH: 0
CHEST TIGHTNESS: 0

## 2023-05-22 NOTE — TELEPHONE ENCOUNTER
----- Message from [facility] [employer] sent at 5/22/2023 11:14 AM EDT -----  Subject: Medication Problem    Medication: methylPREDNISolone (MEDROL DOSEPACK) 4 MG tablet  Dosage: as prescribed  Ordering Provider: samuel    Question/Problem: Patient would like to know if Dr wants him to take all 6   pills at one time. Also the prescription says take for 6 days but he   thought he was only suppose to take for 4 days. Please call patient to   discuss.       Pharmacy: [facility] [address]   [phone] [facility] [phone]    ---------------------------------------------------------------------------  --------------  Gianni Mt MAYCO  [phone]; OK to leave message on voicemail  ---------------------------------------------------------------------------  --------------    SCRIPT ANSWERS  Relationship to Patient: Self

## 2023-05-22 NOTE — TELEPHONE ENCOUNTER
Lvm asking pt to cb. Called pharmacy and was informed on how medication should be taken (per package)    Day 1: 2 with breakfast, 1 with Lunch, 1 with supper, 2 at bed time. Day 2: 1 with breakfast, 1 with Lunch, 1 with supper, 2 at bed time. Day 3: 1 with breakfast, 1 with Lunch, 1 with supper, 1 at bed time. Day 4: 1 with breakfast, 1 with Lunch, none with supper, 1 at bed time. Day 5: 1 with breakfast, none with Lunch or supper, 1 at bed time.     Day 6: 1 with breakfast.

## 2023-05-22 NOTE — PROGRESS NOTES
[facility] PRIMARY AND SPECIALTY CARE  [address] [address] [address] [address]  Dept: [phone]  Dept Fax: [phone]  Loc: Karen Warner (: 1950) is a 75 y.o. male, Established patient, here for evaluation of the following chief complaint(s):  Fall (Had a fall a few weeks ago), Other (Underarm is hurting, on left side./Feels like there's little lumps in it), and Back Pain (Across back.)      PCP:  GARY Perez CNP    Last fall was beginning of may, had fell on concrete in his driveway. THREE XRAY VIEWS OF THE LEFT SHOULDER     2023 3:44 pm     COMPARISON:  No prior     HISTORY:  ORDERING SYSTEM PROVIDED HISTORY: History of fall, Acute pain of left  shoulder, Acute midline thoracic back pain  TECHNOLOGIST PROVIDED HISTORY:  What reading provider will be dictating this exam?->CRC     FINDINGS:  Mild joint space narrowing is seen in the glenohumeral joint in inferiorly. Degenerative changes are also seen in the acromioclavicular joint. There may  be slight widening of the subacromial space. The visualized lung field is  unremarkable. IMPRESSION:  Mild degenerative changes are seen. Slight increased subacromial space could  reflect an effusion. HISTORY:  ORDERING SYSTEM PROVIDED HISTORY: Acute pain of left shoulder, Acute midline  thoracic back pain  TECHNOLOGIST PROVIDED HISTORY:  What reading provider will be dictating this exam?->CRC     FINDINGS:  There is some hyperinflation of the upper lobes. There are no infiltrates or  effusions. Heart size is normal.     No left-sided rib fractures are noted. Vertebral body height and alignment in the thoracic spine are normal.  There  are small anterior spurs in the mid and lower thoracic spine. Some  degenerative changes are noted at C6-7 with some disc space narrowing  suspected.   There is some

## 2023-06-22 ENCOUNTER — OFFICE VISIT (OUTPATIENT)
Dept: FAMILY MEDICINE CLINIC | Age: 73
End: 2023-06-22

## 2023-06-22 VITALS
OXYGEN SATURATION: 98 % | HEIGHT: 78 IN | DIASTOLIC BLOOD PRESSURE: 64 MMHG | TEMPERATURE: 98 F | BODY MASS INDEX: 25.11 KG/M2 | HEART RATE: 77 BPM | SYSTOLIC BLOOD PRESSURE: 128 MMHG | WEIGHT: 217 LBS

## 2023-06-22 DIAGNOSIS — L65.9 HAIR THINNING: Primary | ICD-10-CM

## 2023-06-22 DIAGNOSIS — Z12.5 SCREENING FOR PROSTATE CANCER: ICD-10-CM

## 2023-06-22 DIAGNOSIS — L65.9 HAIR THINNING: ICD-10-CM

## 2023-06-22 DIAGNOSIS — I47.29 NSVT (NONSUSTAINED VENTRICULAR TACHYCARDIA) (HCC): ICD-10-CM

## 2023-06-22 DIAGNOSIS — I42.9 CARDIOMYOPATHY, UNSPECIFIED TYPE (HCC): ICD-10-CM

## 2023-06-22 DIAGNOSIS — Z12.11 SCREENING FOR COLON CANCER: ICD-10-CM

## 2023-06-22 LAB
BASOPHILS # BLD: 0 K/UL (ref 0–0.2)
BASOPHILS NFR BLD: 0.8 %
EOSINOPHIL # BLD: 0.1 K/UL (ref 0–0.7)
EOSINOPHIL NFR BLD: 1.1 %
ERYTHROCYTE [DISTWIDTH] IN BLOOD BY AUTOMATED COUNT: 15.1 % (ref 11.5–14.5)
HCT VFR BLD AUTO: 43.6 % (ref 42–52)
HGB BLD-MCNC: 14.4 G/DL (ref 14–18)
LYMPHOCYTES # BLD: 1.7 K/UL (ref 1–4.8)
LYMPHOCYTES NFR BLD: 25.9 %
MCH RBC QN AUTO: 31.6 PG (ref 27–31.3)
MCHC RBC AUTO-ENTMCNC: 33.1 % (ref 33–37)
MCV RBC AUTO: 95.3 FL (ref 79–92.2)
MONOCYTES # BLD: 0.5 K/UL (ref 0.2–0.8)
MONOCYTES NFR BLD: 7.8 %
NEUTROPHILS # BLD: 4.1 K/UL (ref 1.4–6.5)
NEUTS SEG NFR BLD: 64.4 %
PLATELET # BLD AUTO: 234 K/UL (ref 130–400)
PSA SERPL-MCNC: 15.63 NG/ML (ref 0–4)
RBC # BLD AUTO: 4.57 M/UL (ref 4.7–6.1)
TSH REFLEX: 0.86 UIU/ML (ref 0.44–3.86)
WBC # BLD AUTO: 6.4 K/UL (ref 4.8–10.8)

## 2023-06-22 PROCEDURE — 99214 OFFICE O/P EST MOD 30 MIN: CPT | Performed by: NURSE PRACTITIONER

## 2023-06-22 PROCEDURE — 1123F ACP DISCUSS/DSCN MKR DOCD: CPT | Performed by: NURSE PRACTITIONER

## 2023-06-22 ASSESSMENT — ENCOUNTER SYMPTOMS
SHORTNESS OF BREATH: 0
CHEST TIGHTNESS: 0

## 2023-06-22 NOTE — PROGRESS NOTES
[address] [facility] PRIMARY AND SPECIALTY CARE  [address] [facility] [address]  Dept: [phone]  Dept Fax: [phone]: Karen Warner (: 1950) is a 75 y.o. male, Established patient, here for evaluation of the following chief complaint(s):  Hair/Scalp Problem (Having a lot of shedding in the last 3 weeks/Scalp is itchy/tingly)      PCP:  GARY Morgan - ZARA      HPI    History of hair loss for 2 weeks. States is shedding a ton and states he feels he has lost half of his hair. Has always used the same drugstore shampoo. Hasnt noticed any changes to his scalp such as dryness or flaking. Admits to covid previously, unsure if related to this. Review of Systems   Constitutional:  Negative for activity change, fatigue and fever. Respiratory:  Negative for chest tightness and shortness of breath. Cardiovascular:  Negative for chest pain and leg swelling. Skin: Negative. Neurological:  Negative for dizziness and weakness. Past Medical History:   Diagnosis Date    Chronic systolic (congestive) heart failure 2023     No past surgical history on file.   Social History     Socioeconomic History    Marital status:      Spouse name: Not on file    Number of children: Not on file    Years of education: Not on file    Highest education level: Not on file   Occupational History    Not on file   Tobacco Use    Smoking status: Former     Packs/day: 2.00     Years: 27.00     Pack years: 54.00     Types: Cigarettes     Start date: 64     Quit date: 1986     Years since quittin.4    Smokeless tobacco: Never    Tobacco comments:     quit 32 years ago   Vaping Use    Vaping Use: Never used   Substance and Sexual Activity    Alcohol use: Not Currently     Comment: occasionally    Drug use: Never    Sexual activity: Yes     Partners: Female   Other Topics

## 2023-06-23 ENCOUNTER — TELEPHONE (OUTPATIENT)
Dept: FAMILY MEDICINE CLINIC | Age: 73
End: 2023-06-23

## 2023-06-23 LAB
IRON SATURATION: 17 % (ref 20–55)
IRON: 49 UG/DL (ref 59–158)
SHBG SERPL-SCNC: 75 NMOL/L (ref 11–80)
TESTOST FREE SERPL-MCNC: 52 PG/ML (ref 47–244)
TESTOST SERPL-MCNC: 452 NG/DL (ref 220–1000)
TOTAL IRON BINDING CAPACITY: 294 UG/DL (ref 250–450)
UNSATURATED IRON BINDING CAPACITY: 245 UG/DL (ref 112–347)

## 2023-06-23 NOTE — TELEPHONE ENCOUNTER
Patient calling into the office stating that he just saw provider the other day for his hair falling out. He states he was talking to a friend and they state that he had dht levels checked. He would like this checked. Please advise and then call the patient.

## 2023-06-26 DIAGNOSIS — L65.9 HAIR THINNING: ICD-10-CM

## 2023-06-26 DIAGNOSIS — L65.9 HAIR THINNING: Primary | ICD-10-CM

## 2023-06-26 DIAGNOSIS — R97.20 ELEVATED PSA: Primary | ICD-10-CM

## 2023-06-26 DIAGNOSIS — R97.20 ELEVATED PSA: ICD-10-CM

## 2023-06-26 LAB — PSA SERPL-MCNC: 16.01 NG/ML (ref 0–4)

## 2023-06-26 RX ORDER — FINASTERIDE 1 MG/1
1 TABLET, FILM COATED ORAL DAILY
Qty: 30 TABLET | Refills: 5 | Status: SHIPPED | OUTPATIENT
Start: 2023-06-26

## 2023-06-27 ENCOUNTER — TELEPHONE (OUTPATIENT)
Dept: UROLOGY | Age: 73
End: 2023-06-27

## 2023-06-27 ENCOUNTER — TELEPHONE (OUTPATIENT)
Dept: FAMILY MEDICINE CLINIC | Age: 73
End: 2023-06-27

## 2023-06-29 LAB — ANDROSTANOLONE SERPL-MCNC: 399.7 PG/ML (ref 106–719)

## 2023-07-07 ENCOUNTER — TELEPHONE (OUTPATIENT)
Dept: FAMILY MEDICINE CLINIC | Age: 73
End: 2023-07-07

## 2023-07-07 NOTE — TELEPHONE ENCOUNTER
----- Message from [address] [place] [address] sent at 7/7/2023  1:23 PM EDT -----  Subject: Referral Request    Reason for referral request? red itchy scalp and hair loss getting worse   feels like things crawling on his head  Provider patient wants to be referred to(if known):     Provider Phone Number(if known):     Additional Information for Provider? wants referred to dermatologist close   by, possible at the hospital   ---------------------------------------------------------------------------  --------------  [address]    [phone]; OK to leave message on voicemail  ---------------------------------------------------------------------------  --------------

## 2023-10-16 ENCOUNTER — OFFICE VISIT (OUTPATIENT)
Dept: UROLOGY | Age: 73
End: 2023-10-16

## 2023-10-16 VITALS — DIASTOLIC BLOOD PRESSURE: 86 MMHG | HEART RATE: 75 BPM | SYSTOLIC BLOOD PRESSURE: 128 MMHG

## 2023-10-16 DIAGNOSIS — R97.20 ELEVATED PSA: Primary | ICD-10-CM

## 2023-10-16 LAB
BILIRUBIN, POC: NORMAL
BLOOD URINE, POC: NORMAL
CLARITY, POC: CLEAR
COLOR, POC: YELLOW
GLUCOSE URINE, POC: NORMAL
KETONES, POC: NORMAL
LEUKOCYTE EST, POC: NORMAL
NITRITE, POC: NORMAL
PH, POC: 5.5
PROTEIN, POC: NORMAL
SPECIFIC GRAVITY, POC: 1.02
UROBILINOGEN, POC: 0.2

## 2023-10-16 PROCEDURE — 99214 OFFICE O/P EST MOD 30 MIN: CPT | Performed by: UROLOGY

## 2023-10-16 PROCEDURE — 1123F ACP DISCUSS/DSCN MKR DOCD: CPT | Performed by: UROLOGY

## 2023-10-16 PROCEDURE — 81003 URINALYSIS AUTO W/O SCOPE: CPT | Performed by: UROLOGY

## 2023-10-16 ASSESSMENT — ENCOUNTER SYMPTOMS
ABDOMINAL PAIN: 0
ABDOMINAL DISTENTION: 0

## 2023-10-16 NOTE — PROGRESS NOTES
Subjective:      Patient ID: Bradford Hamilton is a 75 y.o. male    HPI  This is a 76 yo male with h/o CHF and prior patient of Dr Renny Faulkner and [address] [place] [address] Urology followed with elevated PSA. According to review of Dr Jace Mack prior notes and [facility], he has h/o elevated PSA's since age 35. PSA [facility]  11.4 ng/ml. It has been recommended he have prostate biopsy by his prior Urologists and has not complied because of his inability to take time away from his wife and disabled son. He has no one who can help him. He has not been willing to comply with biopsy recommendations also because of concern for complications and can not get an MRI due to fear of tight spaces and does not want sedation. He has some frequency and urgency that is intermittent and related to beer consumption. He has no hematuria or dysuria or pain. I reviewed the PSA history. Past Medical History:   Diagnosis Date    Chronic systolic (congestive) heart failure 2023     History reviewed. No pertinent surgical history.   Social History     Socioeconomic History    Marital status:      Spouse name: None    Number of children: None    Years of education: None    Highest education level: None   Tobacco Use    Smoking status: Former     Packs/day: 2.00     Years: 27.00     Additional pack years: 0.00     Total pack years: 54.00     Types: Cigarettes     Start date:      Quit date: 1986     Years since quittin.8    Smokeless tobacco: Never    Tobacco comments:     quit 32 years ago   Vaping Use    Vaping Use: Never used   Substance and Sexual Activity    Alcohol use: Not Currently     Comment: occasionally    Drug use: Never    Sexual activity: Yes     Partners: Female     Social Determinants of Health     Financial Resource Strain: Medium Risk (2023)    Overall Financial Resource Strain (CARDIA)     Difficulty of Paying Living Expenses: Somewhat hard   Food Insecurity: Food Insecurity Present (2023)    Hunger Vital

## 2024-04-11 DIAGNOSIS — R97.20 ELEVATED PSA: ICD-10-CM

## 2024-04-11 LAB — PSA SERPL-MCNC: 15.95 NG/ML (ref 0–4)

## 2024-04-16 ENCOUNTER — OFFICE VISIT (OUTPATIENT)
Dept: UROLOGY | Age: 74
End: 2024-04-16

## 2024-04-16 VITALS
HEART RATE: 74 BPM | DIASTOLIC BLOOD PRESSURE: 76 MMHG | WEIGHT: 215 LBS | HEIGHT: 78 IN | SYSTOLIC BLOOD PRESSURE: 134 MMHG | BODY MASS INDEX: 24.88 KG/M2

## 2024-04-16 DIAGNOSIS — R97.20 ELEVATED PSA: Primary | ICD-10-CM

## 2024-04-16 PROCEDURE — 99213 OFFICE O/P EST LOW 20 MIN: CPT | Performed by: UROLOGY

## 2024-04-16 PROCEDURE — 1123F ACP DISCUSS/DSCN MKR DOCD: CPT | Performed by: UROLOGY

## 2024-04-16 ASSESSMENT — ENCOUNTER SYMPTOMS
ABDOMINAL PAIN: 0
ABDOMINAL DISTENTION: 0

## 2024-04-16 NOTE — PROGRESS NOTES
Subjective:      Patient ID: Lyndon Garland is a 74 y.o. male    HPI This is a 72 yo male with h/o CHF and prior patient of Dr Hannah and [facility] Urology followed with elevated PSA. According to review of Dr Hannah's prior notes and [facility], he has h/o elevated PSA's since age 39. PSA [facility]  11.4 ng/ml. It has been recommended he have prostate biopsy by his prior Urologists and has not complied because of his inability to take time away from his wife and disabled son. He has no one who can help him. He has not been willing to comply with biopsy recommendations also because of concern for complications and can not get an MRI due to fear of tight spaces and does not want sedation. Since last seen on 10/16/23, he has some frequency and urgency that is intermittent and related to beer consumption. He has no hematuria or dysuria or pain. I reviewed the PSA history     Past Medical History:   Diagnosis Date   • Chronic systolic (congestive) heart failure 2023     History reviewed. No pertinent surgical history.  Social History     Socioeconomic History   • Marital status:      Spouse name: None   • Number of children: None   • Years of education: None   • Highest education level: None   Tobacco Use   • Smoking status: Former     Current packs/day: 0.00     Average packs/day: 2.0 packs/day for 27.0 years (54.0 ttl pk-yrs)     Types: Cigarettes     Start date:      Quit date: 1986     Years since quittin.3   • Smokeless tobacco: Never   • Tobacco comments:     quit 32 years ago   Vaping Use   • Vaping Use: Never used   Substance and Sexual Activity   • Alcohol use: Not Currently     Comment: occasionally   • Drug use: Never   • Sexual activity: Yes     Partners: Female     Social Determinants of Health     Financial Resource Strain: Medium Risk (2023)    Overall Financial Resource Strain (CARDIA)    • Difficulty of Paying Living Expenses: Somewhat hard   Food Insecurity:   Recent

## 2024-05-22 ENCOUNTER — TELEPHONE (OUTPATIENT)
Dept: UROLOGY | Age: 74
End: 2024-05-22

## 2024-05-22 ENCOUNTER — NURSE ONLY (OUTPATIENT)
Dept: UROLOGY | Age: 74
End: 2024-05-22

## 2024-05-22 DIAGNOSIS — R39.89 BLADDER PAIN: Primary | ICD-10-CM

## 2024-05-22 LAB
BILIRUBIN, POC: NORMAL
BLOOD URINE, POC: NORMAL
CLARITY, POC: CLEAR
COLOR, POC: YELLOW
GLUCOSE URINE, POC: NORMAL
KETONES, POC: NORMAL
LEUKOCYTE EST, POC: NORMAL
NITRITE, POC: NORMAL
PH, POC: NORMAL
PROTEIN, POC: NORMAL
SPECIFIC GRAVITY, POC: 1.02
UROBILINOGEN, POC: 0.2

## 2024-05-22 PROCEDURE — 81003 URINALYSIS AUTO W/O SCOPE: CPT | Performed by: UROLOGY

## 2024-05-22 NOTE — TELEPHONE ENCOUNTER
Spoke with pt he says sometimes he gets a good stream but mostly he is dribbling. He wants to come in on Wed the 29th. Appt was made

## 2024-05-22 NOTE — TELEPHONE ENCOUNTER
Pt called and left a message on machine with c/o burning, bladder pain and hard time urinating that has been going on for a few weeks. Would you like a urine sample?

## 2024-05-22 NOTE — TELEPHONE ENCOUNTER
Pt drop his urine off it showed      Component 10:32   Color, UA yellow   Clarity, UA clear   Glucose, UA POC neg   Bilirubin, UA neg   Ketones, UA neg   Spec Grav, UA 1.025   Blood, UA POC neg   pH, UA 6.5.   Protein, UA POC neg   Urobilinogen, UA 0.2   Leukocytes, UA neg   Nitrite, UA neg

## 2024-05-29 ENCOUNTER — OFFICE VISIT (OUTPATIENT)
Dept: UROLOGY | Age: 74
End: 2024-05-29

## 2024-05-29 VITALS
WEIGHT: 215 LBS | HEIGHT: 78 IN | SYSTOLIC BLOOD PRESSURE: 142 MMHG | HEART RATE: 85 BPM | DIASTOLIC BLOOD PRESSURE: 88 MMHG | BODY MASS INDEX: 24.88 KG/M2

## 2024-05-29 DIAGNOSIS — N13.8 BPH WITH OBSTRUCTION/LOWER URINARY TRACT SYMPTOMS: Primary | ICD-10-CM

## 2024-05-29 DIAGNOSIS — N40.1 BPH WITH OBSTRUCTION/LOWER URINARY TRACT SYMPTOMS: Primary | ICD-10-CM

## 2024-05-29 PROCEDURE — 51798 US URINE CAPACITY MEASURE: CPT | Performed by: UROLOGY

## 2024-05-29 PROCEDURE — 51741 ELECTRO-UROFLOWMETRY FIRST: CPT | Performed by: UROLOGY

## 2024-05-29 PROCEDURE — 99214 OFFICE O/P EST MOD 30 MIN: CPT | Performed by: UROLOGY

## 2024-05-29 PROCEDURE — 1123F ACP DISCUSS/DSCN MKR DOCD: CPT | Performed by: UROLOGY

## 2024-05-29 RX ORDER — FINASTERIDE 5 MG/1
5 TABLET, FILM COATED ORAL DAILY
Qty: 90 TABLET | Refills: 3 | Status: SHIPPED | OUTPATIENT
Start: 2024-05-29

## 2024-05-29 NOTE — PROGRESS NOTES
Determinants of Health     Financial Resource Strain: Medium Risk (4/13/2023)    Overall Financial Resource Strain (CARDIA)     Difficulty of Paying Living Expenses: Somewhat hard   Food Insecurity:   Recent Concern: Food Insecurity - Food Insecurity Present (4/13/2023)    Hunger Vital Sign     Worried About Running Out of Food in the Last Year: Sometimes true     Ran Out of Food in the Last Year: Sometimes true   Transportation Needs: Unknown (4/13/2023)    PRAPARE - Transportation     Lack of Transportation (Non-Medical): No   Housing Stability: Unknown (4/13/2023)    Housing Stability Vital Sign     Unstable Housing in the Last Year: No     History reviewed. No pertinent family history.  Current Outpatient Medications   Medication Sig Dispense Refill    NONFORMULARY daily Super beta prostate      finasteride (PROSCAR) 5 MG tablet Take 1 tablet by mouth daily 90 tablet 3    ZINC PO Take by mouth      Ascorbic Acid (OCTAVIO-C PO) Take by mouth      Cholecalciferol (VITAMIN D3) 1000 UNITS TABS Take by mouth       No current facility-administered medications for this visit.     Bactrim [sulfamethoxazole-trimethoprim], Ciprofloxacin, and Pepcid [famotidine]  reviewed      Review of Systems   Constitutional:  Negative for fever.   Genitourinary:  Positive for difficulty urinating. Negative for hematuria.         Objective:   Physical Exam  Constitutional:       Appearance: Normal appearance.   Abdominal:      General: There is no distension.   Neurological:      Mental Status: He is alert.   Psychiatric:         Mood and Affect: Mood normal.      Uroflow: 3 ml/sec, vol 18 cc  post void residual by U/S: 26 cc    POCT Urinalysis No Micro (Auto)  Order: [id number]  Status: Final result       Visible to patient: No (not released)       Next appt: 10/22/2024 at 10:45 AM in Urology (Ovi Georges MD)       Dx: Bladder pain    0 Result Notes      Component 5/22/24 1032   Color, UA yellow   Clarity, UA clear   Glucose, UA

## 2024-10-17 DIAGNOSIS — R97.20 ELEVATED PSA: Primary | ICD-10-CM

## 2024-10-18 DIAGNOSIS — R97.20 ELEVATED PSA: ICD-10-CM

## 2024-10-18 LAB — PSA SERPL-MCNC: 8.56 NG/ML (ref 0–4)

## 2024-10-22 ENCOUNTER — OFFICE VISIT (OUTPATIENT)
Dept: UROLOGY | Age: 74
End: 2024-10-22

## 2024-10-22 VITALS
HEART RATE: 50 BPM | WEIGHT: 215 LBS | SYSTOLIC BLOOD PRESSURE: 118 MMHG | BODY MASS INDEX: 24.88 KG/M2 | DIASTOLIC BLOOD PRESSURE: 64 MMHG | HEIGHT: 78 IN

## 2024-10-22 DIAGNOSIS — R97.20 ELEVATED PSA: Primary | ICD-10-CM

## 2024-10-22 PROCEDURE — 99214 OFFICE O/P EST MOD 30 MIN: CPT | Performed by: UROLOGY

## 2024-10-22 PROCEDURE — 1123F ACP DISCUSS/DSCN MKR DOCD: CPT | Performed by: UROLOGY

## 2024-10-22 ASSESSMENT — ENCOUNTER SYMPTOMS
ABDOMINAL DISTENTION: 0
ABDOMINAL PAIN: 0

## 2024-10-22 NOTE — PROGRESS NOTES
Subjective:      Patient ID: Lyndon Garland is a 74 y.o. male    HPI  This is a 75 yo male with h/o CHF and prior patient of Dr Hannah and [facility] followed with elevated PSA. According to review of Dr Hannah's prior notes and [facility], he has h/o elevated PSA's since age 39. PSA [facility]  11.4 ng/ml. It has been recommended he have prostate biopsy by his prior Urologists and has not complied because of his inability to take time away from his wife and disabled son. He has no one who can help him. He has not been willing to comply with biopsy recommendations also because of concern for complications and can not get an MRI due to fear of tight spaces and does not want sedation. Since last seen on 24, he was started on Proscar and feels this has helped with his voiding. He gets some NF and will drink less at night. He has no interval medical problems. I reviewed the interval PSA.     Past Medical History:   Diagnosis Date   • Chronic systolic (congestive) heart failure 2023     History reviewed. No pertinent surgical history.  Social History     Socioeconomic History   • Marital status:      Spouse name: None   • Number of children: None   • Years of education: None   • Highest education level: None   Tobacco Use   • Smoking status: Former     Current packs/day: 0.00     Average packs/day: 2.0 packs/day for 27.0 years (54.0 ttl pk-yrs)     Types: Cigarettes     Start date:      Quit date: 1986     Years since quittin.8   • Smokeless tobacco: Never   • Tobacco comments:     quit 32 years ago   Vaping Use   • Vaping status: Never Used   Substance and Sexual Activity   • Alcohol use: Not Currently     Comment: occasionally   • Drug use: Never   • Sexual activity: Yes     Partners: Female     Social Determinants of Health     Financial Resource Strain: Medium Risk (2023)    Overall Financial Resource Strain (CARDIA)    • Difficulty of Paying Living Expenses: Somewhat hard

## 2025-02-13 ENCOUNTER — OFFICE VISIT (OUTPATIENT)
Dept: CARDIOLOGY CLINIC | Age: 75
End: 2025-02-13

## 2025-02-13 VITALS
WEIGHT: 209.2 LBS | DIASTOLIC BLOOD PRESSURE: 70 MMHG | BODY MASS INDEX: 22.98 KG/M2 | OXYGEN SATURATION: 98 % | SYSTOLIC BLOOD PRESSURE: 126 MMHG | HEART RATE: 67 BPM

## 2025-02-13 DIAGNOSIS — Z00.00 ROUTINE ADULT HEALTH MAINTENANCE: Primary | ICD-10-CM

## 2025-02-13 DIAGNOSIS — R42 DIZZINESS: ICD-10-CM

## 2025-02-13 DIAGNOSIS — I10 HYPERTENSION, UNSPECIFIED TYPE: ICD-10-CM

## 2025-02-13 DIAGNOSIS — E78.5 DYSLIPIDEMIA: ICD-10-CM

## 2025-02-13 DIAGNOSIS — R06.02 SHORTNESS OF BREATH: ICD-10-CM

## 2025-02-13 DIAGNOSIS — R09.89 BILATERAL CAROTID BRUITS: ICD-10-CM

## 2025-02-13 DIAGNOSIS — R06.09 DOE (DYSPNEA ON EXERTION): ICD-10-CM

## 2025-02-13 PROCEDURE — 1123F ACP DISCUSS/DSCN MKR DOCD: CPT | Performed by: INTERNAL MEDICINE

## 2025-02-13 PROCEDURE — 3074F SYST BP LT 130 MM HG: CPT | Performed by: INTERNAL MEDICINE

## 2025-02-13 PROCEDURE — 99204 OFFICE O/P NEW MOD 45 MIN: CPT | Performed by: INTERNAL MEDICINE

## 2025-02-13 PROCEDURE — 93000 ELECTROCARDIOGRAM COMPLETE: CPT | Performed by: INTERNAL MEDICINE

## 2025-02-13 PROCEDURE — 1159F MED LIST DOCD IN RCRD: CPT | Performed by: INTERNAL MEDICINE

## 2025-02-13 PROCEDURE — 3078F DIAST BP <80 MM HG: CPT | Performed by: INTERNAL MEDICINE

## 2025-02-13 RX ORDER — OMEGA-3S/DHA/EPA/FISH OIL/D3 360MG-1000
CAPSULE ORAL
COMMUNITY

## 2025-02-13 RX ORDER — ASPIRIN 81 MG/1
81 TABLET ORAL DAILY
Qty: 90 TABLET | Refills: 0
Start: 2025-02-13

## 2025-02-13 RX ORDER — ATORVASTATIN CALCIUM 20 MG/1
20 TABLET, FILM COATED ORAL DAILY
Qty: 90 TABLET | Refills: 3 | Status: SHIPPED | OUTPATIENT
Start: 2025-02-13

## 2025-02-13 ASSESSMENT — ENCOUNTER SYMPTOMS
BLOOD IN STOOL: 0
GASTROINTESTINAL NEGATIVE: 1
CHEST TIGHTNESS: 0
WHEEZING: 0
NAUSEA: 0
EYES NEGATIVE: 1
STRIDOR: 0
COUGH: 0
SHORTNESS OF BREATH: 1

## 2025-02-13 NOTE — PROGRESS NOTES
NEW PATIENT        Patient: Lyndon Garland  YOB: 1950  MRN: [id number]    Chief Complaint:  Chief Complaint   Patient presents with    Establish Cardiologist     Minor heart failure   SOB with extortion   Dizziness when standing up to quickly   A week ago back was shaking and heart beat was rapid after waking up  One time had ringing in ears and vision went black        CV Data:  2020 spect negative  2020 HM- extensive PVCs and 1 brief NSVT  2020 CUS LICA 50-69  2020 Echo EF 40    Subjective/HPI  referred for CV care. Pt does have ENGLE but no CP no falls but gets occ LH. No bleed.      EKG: SR 79    Lives w wife  Former smoker  Occ Beer.   ++FH  Retired - retired [employer] man      Past Medical History:   Diagnosis Date    Chronic systolic (congestive) heart failure 2023       No past surgical history on file.    No family history on file.    Social History     Socioeconomic History    Marital status:    Tobacco Use    Smoking status: Former     Current packs/day: 0.00     Average packs/day: 2.0 packs/day for 27.0 years (54.0 ttl pk-yrs)     Types: Cigarettes     Start date:      Quit date: 1986     Years since quittin.1    Smokeless tobacco: Never    Tobacco comments:     quit 32 years ago   Vaping Use    Vaping status: Never Used   Substance and Sexual Activity    Alcohol use: Not Currently     Comment: occasionally    Drug use: Never    Sexual activity: Yes     Partners: Female     Social Determinants of Health     Financial Resource Strain: Medium Risk (2023)    Overall Financial Resource Strain (CARDIA)     Difficulty of Paying Living Expenses: Somewhat hard   Food Insecurity:   Recent Concern: Food Insecurity - Food Insecurity Present (2023)    Hunger Vital Sign     Worried About Running Out of Food in the Last Year: Sometimes true     Ran Out of Food in the Last Year: Sometimes true   Transportation Needs: Unknown (2023)    PRAPARE -

## 2025-06-04 RX ORDER — FINASTERIDE 5 MG/1
5 TABLET, FILM COATED ORAL DAILY
Qty: 90 TABLET | Refills: 3 | Status: SHIPPED | OUTPATIENT
Start: 2025-06-04

## 2025-06-06 ENCOUNTER — TELEPHONE (OUTPATIENT)
Dept: PHARMACY | Facility: CLINIC | Age: 75
End: 2025-06-06

## 2025-06-11 NOTE — TELEPHONE ENCOUNTER
No response from provider    Lavonne Costello, PharmD, [facility]  [facility]  [facility] Clinical Pharmacist  Department: [phone]    
[facility]  [facility]  [facility] Clinical Pharmacist  Department: [phone]     For Pharmacy Admin Tracking Only    Program: [facility] [employer]  CPA in place:  No  Recommendation Provided To: Provider: 1 via Note to Provider  Intervention Detail: New Rx: 1, reason: Needs Additional Therapy  Intervention Accepted By: Provider: 0  Gap Closed?: No   Time Spent (min): 20
